# Patient Record
Sex: FEMALE | Race: WHITE
[De-identification: names, ages, dates, MRNs, and addresses within clinical notes are randomized per-mention and may not be internally consistent; named-entity substitution may affect disease eponyms.]

---

## 2021-09-29 ENCOUNTER — HOSPITAL ENCOUNTER (OUTPATIENT)
Dept: HOSPITAL 61 - LAB | Age: 76
End: 2021-09-29
Attending: OPHTHALMOLOGY
Payer: MEDICARE

## 2021-09-29 DIAGNOSIS — Z20.822: ICD-10-CM

## 2021-09-29 DIAGNOSIS — Z01.812: Primary | ICD-10-CM

## 2021-09-29 PROCEDURE — U0003 INFECTIOUS AGENT DETECTION BY NUCLEIC ACID (DNA OR RNA); SEVERE ACUTE RESPIRATORY SYNDROME CORONAVIRUS 2 (SARS-COV-2) (CORONAVIRUS DISEASE [COVID-19]), AMPLIFIED PROBE TECHNIQUE, MAKING USE OF HIGH THROUGHPUT TECHNOLOGIES AS DESCRIBED BY CMS-2020-01-R: HCPCS

## 2021-10-01 ENCOUNTER — HOSPITAL ENCOUNTER (OUTPATIENT)
Dept: HOSPITAL 61 - SURG | Age: 76
Discharge: HOME | End: 2021-10-01
Attending: OPHTHALMOLOGY
Payer: MEDICARE

## 2021-10-01 VITALS — WEIGHT: 198.42 LBS | BODY MASS INDEX: 35.16 KG/M2 | HEIGHT: 63 IN

## 2021-10-01 VITALS — DIASTOLIC BLOOD PRESSURE: 78 MMHG | SYSTOLIC BLOOD PRESSURE: 178 MMHG

## 2021-10-01 VITALS — DIASTOLIC BLOOD PRESSURE: 76 MMHG | SYSTOLIC BLOOD PRESSURE: 174 MMHG

## 2021-10-01 DIAGNOSIS — Z98.42: ICD-10-CM

## 2021-10-01 DIAGNOSIS — H25.89: Primary | ICD-10-CM

## 2021-10-01 DIAGNOSIS — Z90.49: ICD-10-CM

## 2021-10-01 DIAGNOSIS — Z79.899: ICD-10-CM

## 2021-10-01 DIAGNOSIS — Z98.890: ICD-10-CM

## 2021-10-01 DIAGNOSIS — I10: ICD-10-CM

## 2021-10-01 PROCEDURE — V2632 POST CHMBR INTRAOCULAR LENS: HCPCS

## 2021-10-01 PROCEDURE — 66984 XCAPSL CTRC RMVL W/O ECP: CPT

## 2021-10-01 RX ADMIN — CYCLOPENTOLATE HYDROCHLORIDE SCH DROP: 20 SOLUTION/ DROPS OPHTHALMIC at 11:30

## 2021-10-01 RX ADMIN — CYCLOPENTOLATE HYDROCHLORIDE SCH DROP: 20 SOLUTION/ DROPS OPHTHALMIC at 11:35

## 2021-10-01 RX ADMIN — CYCLOPENTOLATE HYDROCHLORIDE SCH DROP: 20 SOLUTION/ DROPS OPHTHALMIC at 11:40

## 2021-10-01 RX ADMIN — PHENYLEPHRINE HYDROCHLORIDE SCH DROP: 100 SOLUTION/ DROPS OPHTHALMIC at 11:40

## 2021-10-01 RX ADMIN — PHENYLEPHRINE HYDROCHLORIDE SCH DROP: 100 SOLUTION/ DROPS OPHTHALMIC at 11:30

## 2021-10-01 RX ADMIN — PHENYLEPHRINE HYDROCHLORIDE SCH DROP: 100 SOLUTION/ DROPS OPHTHALMIC at 11:35

## 2021-10-01 NOTE — OP
DATE OF SURGERY: 10/01/2021

PREOPERATIVE DIAGNOSIS:   Cataract of the left eye.



PROCEDURE:  Phacoemulsification with posterior chamber intraocular lens 

implantation of the left eye.



INDICATION:  Painless progressive visual loss and visually significant cataract 

and difficulty reading and driving.



SURGEON:  Candis Araya MD.



ANESTHESIA:  Topical with monitored anesthesia care.



DESCRIPTION OF PROCEDURE:  The left eye was prepped with Betadine in the usual 

sterile fashion and draped.  A paracentesis was performed followed by 

instillation of preservative-free phenylephrine admixed with lidocaine and 

balanced salt solution.  A temporal clear corneal incision was made, followed by

instillation of viscoelastic.  A capsulorrhexis was performed, followed by 

hydrodissection.  The phacoemulsification handpiece was used to remove the 

nucleus in a modified stop and chop fashion.  The IA handpiece was used to 

remove the cortex.  Provisc was injected into the anterior chamber and an Jd 

model [id number] with a power of 21.5 diopters was placed into the capsular bag.  

Balanced salt solution was used to hydrate the corneal wounds and the 

viscoelastic evacuated with the IA handpiece.  Once no leak was noted, Maxitrol 

was placed on the eye and the eye shielded, and the patient was sent to the 

recovery room uneventfully.







AMADA

DR: Rosetta   DD: 10/01/2021 13:03

DT: 10/01/2021 13:41   TID: [id number]

## 2021-10-08 ENCOUNTER — HOSPITAL ENCOUNTER (OUTPATIENT)
Dept: HOSPITAL 61 - SURG | Age: 76
Discharge: HOME | End: 2021-10-08
Attending: OPHTHALMOLOGY
Payer: MEDICARE

## 2021-10-08 VITALS — DIASTOLIC BLOOD PRESSURE: 79 MMHG | SYSTOLIC BLOOD PRESSURE: 169 MMHG

## 2021-10-08 VITALS — HEIGHT: 63 IN | BODY MASS INDEX: 35.16 KG/M2 | WEIGHT: 198.42 LBS

## 2021-10-08 VITALS — DIASTOLIC BLOOD PRESSURE: 62 MMHG | SYSTOLIC BLOOD PRESSURE: 138 MMHG

## 2021-10-08 DIAGNOSIS — Z98.890: ICD-10-CM

## 2021-10-08 DIAGNOSIS — I10: ICD-10-CM

## 2021-10-08 DIAGNOSIS — H25.89: Primary | ICD-10-CM

## 2021-10-08 DIAGNOSIS — Z90.49: ICD-10-CM

## 2021-10-08 DIAGNOSIS — Z79.899: ICD-10-CM

## 2021-10-08 PROCEDURE — A4930 STERILE, GLOVES PER PAIR: HCPCS

## 2021-10-08 PROCEDURE — V2632 POST CHMBR INTRAOCULAR LENS: HCPCS

## 2021-10-08 PROCEDURE — 66984 XCAPSL CTRC RMVL W/O ECP: CPT

## 2021-10-08 RX ADMIN — CYCLOPENTOLATE HYDROCHLORIDE SCH DROP: 20 SOLUTION/ DROPS OPHTHALMIC at 11:54

## 2021-10-08 RX ADMIN — PHENYLEPHRINE HYDROCHLORIDE SCH DROP: 100 SOLUTION/ DROPS OPHTHALMIC at 11:54

## 2021-10-08 RX ADMIN — CYCLOPENTOLATE HYDROCHLORIDE SCH DROP: 20 SOLUTION/ DROPS OPHTHALMIC at 12:01

## 2021-10-08 RX ADMIN — CYCLOPENTOLATE HYDROCHLORIDE SCH DROP: 20 SOLUTION/ DROPS OPHTHALMIC at 11:48

## 2021-10-08 RX ADMIN — PHENYLEPHRINE HYDROCHLORIDE SCH DROP: 100 SOLUTION/ DROPS OPHTHALMIC at 12:01

## 2021-10-08 RX ADMIN — PHENYLEPHRINE HYDROCHLORIDE SCH DROP: 100 SOLUTION/ DROPS OPHTHALMIC at 11:48

## 2021-10-08 NOTE — OP
DATE OF SURGERY: 10/08/2021

PREOPERATIVE DIAGNOSIS:  Cataract in the right eye.



PROCEDURE:  Phacoemulsification with posterior chamber intraocular lens 

implantation of the right eye.



INDICATIONS: Painless progressive visual loss and visually significant cataract 

and difficulty reading and driving.



SURGEON:  Candis Araya MD



ANESTHESIA:  Topical with monitored anesthesia care.



DESCRIPTION OF PROCEDURE:  The right eye was prepped with Betadine in the usual 

sterile fashion and draped.  A paracentesis was performed followed by 

instillation of preservative-free phenylephrine admixed with lidocaine and 

balanced salt solution.  A temporal clear corneal incision was made followed by 

instillation of Viscoat.  A capsulorrhexis was performed, followed by 

hydrodissection.  The phacoemulsification handpiece was used to remove the 

nucleus in a modified stop and chop fashion.  The I/A handpiece was used to 

remove the cortex.  Provisc was injected in the anterior chamber.  An Jd 

model [id number] with a power of 20.5 diopters was placed into the capsular bag.  

Balanced salt solution was used to hydrate the corneal wounds and the 

viscoelastic evacuated with the I/A handpiece.  Once no leak was noted, Maxitrol

was placed on the eye and the eye shielded and the patient was sent to the 

recovery room uneventfully.







ANNI

DR: Rosetta   DD: 10/08/2021 13:00

DT: 10/08/2021 13:12   TID: [id number]

## 2021-11-12 ENCOUNTER — HOSPITAL ENCOUNTER (OUTPATIENT)
Dept: HOSPITAL 63 - MAMMO | Age: 76
End: 2021-11-12
Attending: FAMILY MEDICINE
Payer: MEDICARE

## 2021-11-12 DIAGNOSIS — Z12.31: Primary | ICD-10-CM

## 2021-11-12 PROCEDURE — 77067 SCR MAMMO BI INCL CAD: CPT

## 2021-11-12 PROCEDURE — 77063 BREAST TOMOSYNTHESIS BI: CPT

## 2021-11-17 NOTE — RAD
Digital Mammogram Bilateral



History:  Routine screening



Technique:   2-D digital CC and MLO views were obtained.   CAD - computer aided detection was utilize
d. 



Comparison:  Mammograms from 5/13/2019 and 5/25/2018.



Findings:

Breast Tissue Density B : There are scattered areas of fibroglandular density



There are no suspicious masses, malignant appearing calcifications, or areas of architectural distort
ion. There are small areas of nodularity in both breasts which have not changed.



Impression:

No evidence of malignancy.



Assessment: BI-RADS Category 1:  Negative.



Recommendation: Routine screening mammograms.



The patient will receive a letter with the results in the mail. Patient information will be entered i
nto the mammography reminder system with a target recall date for the next mammogram. A reminder ruby
er will be generated.



Electronically signed by: Miranda Owens MD (11/17/2021 5:05 PM) [id number]

## 2021-12-21 ENCOUNTER — HOSPITAL ENCOUNTER (INPATIENT)
Dept: HOSPITAL 63 - ER | Age: 76
LOS: 17 days | Discharge: NURSING, REHAB OR LONG-TERM CARE | DRG: 177 | End: 2022-01-07
Attending: INTERNAL MEDICINE | Admitting: INTERNAL MEDICINE
Payer: MEDICARE

## 2021-12-21 VITALS — DIASTOLIC BLOOD PRESSURE: 74 MMHG | SYSTOLIC BLOOD PRESSURE: 173 MMHG

## 2021-12-21 VITALS — BODY MASS INDEX: 36.11 KG/M2 | HEIGHT: 62 IN | WEIGHT: 196.21 LBS

## 2021-12-21 VITALS — DIASTOLIC BLOOD PRESSURE: 82 MMHG | SYSTOLIC BLOOD PRESSURE: 178 MMHG

## 2021-12-21 DIAGNOSIS — E87.0: ICD-10-CM

## 2021-12-21 DIAGNOSIS — I10: ICD-10-CM

## 2021-12-21 DIAGNOSIS — N28.9: ICD-10-CM

## 2021-12-21 DIAGNOSIS — Z98.41: ICD-10-CM

## 2021-12-21 DIAGNOSIS — Z82.49: ICD-10-CM

## 2021-12-21 DIAGNOSIS — Z98.42: ICD-10-CM

## 2021-12-21 DIAGNOSIS — Z28.3: ICD-10-CM

## 2021-12-21 DIAGNOSIS — U07.1: Primary | ICD-10-CM

## 2021-12-21 DIAGNOSIS — J96.01: ICD-10-CM

## 2021-12-21 DIAGNOSIS — J84.10: ICD-10-CM

## 2021-12-21 DIAGNOSIS — J12.82: ICD-10-CM

## 2021-12-21 DIAGNOSIS — Z79.899: ICD-10-CM

## 2021-12-21 DIAGNOSIS — Z66: ICD-10-CM

## 2021-12-21 LAB
ALBUMIN SERPL-MCNC: 3.2 G/DL (ref 3.4–5)
ALBUMIN/GLOB SERPL: 0.7 {RATIO} (ref 1–1.7)
ALP SERPL-CCNC: 247 U/L (ref 46–116)
ALT SERPL-CCNC: 162 U/L (ref 14–59)
ANION GAP SERPL CALC-SCNC: 15 MMOL/L (ref 6–14)
APTT PPP: YELLOW S
AST SERPL-CCNC: 219 U/L (ref 15–37)
BACTERIA #/AREA URNS HPF: 0 /HPF
BASOPHILS # BLD AUTO: 0 X10^3/UL (ref 0–0.2)
BASOPHILS NFR BLD: 0 % (ref 0–3)
BILIRUB SERPL-MCNC: 1.3 MG/DL (ref 0.2–1)
BILIRUB UR QL STRIP: (no result)
BUN/CREAT SERPL: 30 (ref 6–20)
CA-I SERPL ISE-MCNC: 39 MG/DL (ref 7–20)
CALCIUM SERPL-MCNC: 8.8 MG/DL (ref 8.5–10.1)
CHLORIDE SERPL-SCNC: 106 MMOL/L (ref 98–107)
CO2 SERPL-SCNC: 26 MMOL/L (ref 21–32)
CREAT SERPL-MCNC: 1.3 MG/DL (ref 0.6–1)
EOSINOPHIL NFR BLD: 0 % (ref 0–3)
EOSINOPHIL NFR BLD: 0 X10^3/UL (ref 0–0.7)
ERYTHROCYTE [DISTWIDTH] IN BLOOD BY AUTOMATED COUNT: 14.4 % (ref 11.5–14.5)
FIBRINOGEN PPP-MCNC: CLEAR MG/DL
GFR SERPLBLD BASED ON 1.73 SQ M-ARVRAT: 40 ML/MIN
GLOBULIN SER-MCNC: 4.3 G/DL (ref 2.2–3.8)
GLUCOSE SERPL-MCNC: 164 MG/DL (ref 70–99)
GLUCOSE UR STRIP-MCNC: (no result) MG/DL
HCT VFR BLD CALC: 37 % (ref 36–47)
HGB BLD-MCNC: 12.5 G/DL (ref 12–15.5)
INFLUENZA A PATIENT: NEGATIVE
INFLUENZA B PATIENT: NEGATIVE
LYMPHOCYTES # BLD: 1.4 X10^3/UL (ref 1–4.8)
LYMPHOCYTES NFR BLD AUTO: 12 % (ref 24–48)
MCH RBC QN AUTO: 31 PG (ref 25–35)
MCHC RBC AUTO-ENTMCNC: 34 G/DL (ref 31–37)
MCV RBC AUTO: 91 FL (ref 79–100)
MONO #: 0.6 X10^3/UL (ref 0–1.1)
MONOCYTES NFR BLD: 5 % (ref 0–9)
NEUT #: 10.2 X10^3UL (ref 1.8–7.7)
NEUTROPHILS NFR BLD AUTO: 84 % (ref 31–73)
NITRITE UR QL STRIP: (no result)
PLATELET # BLD AUTO: 369 X10^3/UL (ref 140–400)
POTASSIUM SERPL-SCNC: 3.7 MMOL/L (ref 3.5–5.1)
PROT SERPL-MCNC: 7.5 G/DL (ref 6.4–8.2)
RBC # BLD AUTO: 4.07 X10^6/UL (ref 3.5–5.4)
RBC #/AREA URNS HPF: (no result) /HPF (ref 0–2)
SODIUM SERPL-SCNC: 147 MMOL/L (ref 136–145)
SP GR UR STRIP: <=1.005
SQUAMOUS #/AREA URNS LPF: (no result) /LPF
UROBILINOGEN UR-MCNC: 1 MG/DL
WBC # BLD AUTO: 12.1 X10^3/UL (ref 4–11)
WBC #/AREA URNS HPF: (no result) /HPF (ref 0–4)

## 2021-12-21 PROCEDURE — 82947 ASSAY GLUCOSE BLOOD QUANT: CPT

## 2021-12-21 PROCEDURE — 87426 SARSCOV CORONAVIRUS AG IA: CPT

## 2021-12-21 PROCEDURE — 96365 THER/PROPH/DIAG IV INF INIT: CPT

## 2021-12-21 PROCEDURE — U0003 INFECTIOUS AGENT DETECTION BY NUCLEIC ACID (DNA OR RNA); SEVERE ACUTE RESPIRATORY SYNDROME CORONAVIRUS 2 (SARS-COV-2) (CORONAVIRUS DISEASE [COVID-19]), AMPLIFIED PROBE TECHNIQUE, MAKING USE OF HIGH THROUGHPUT TECHNOLOGIES AS DESCRIBED BY CMS-2020-01-R: HCPCS

## 2021-12-21 PROCEDURE — 93005 ELECTROCARDIOGRAM TRACING: CPT

## 2021-12-21 PROCEDURE — 96375 TX/PRO/DX INJ NEW DRUG ADDON: CPT

## 2021-12-21 PROCEDURE — 96372 THER/PROPH/DIAG INJ SC/IM: CPT

## 2021-12-21 PROCEDURE — 85027 COMPLETE CBC AUTOMATED: CPT

## 2021-12-21 PROCEDURE — 5A0955A ASSISTANCE WITH RESPIRATORY VENTILATION, GREATER THAN 96 CONSECUTIVE HOURS, HIGH NASAL FLOW/VELOCITY: ICD-10-PCS | Performed by: INTERNAL MEDICINE

## 2021-12-21 PROCEDURE — 84484 ASSAY OF TROPONIN QUANT: CPT

## 2021-12-21 PROCEDURE — 87804 INFLUENZA ASSAY W/OPTIC: CPT

## 2021-12-21 PROCEDURE — 80048 BASIC METABOLIC PNL TOTAL CA: CPT

## 2021-12-21 PROCEDURE — 94640 AIRWAY INHALATION TREATMENT: CPT

## 2021-12-21 PROCEDURE — 85007 BL SMEAR W/DIFF WBC COUNT: CPT

## 2021-12-21 PROCEDURE — 80053 COMPREHEN METABOLIC PANEL: CPT

## 2021-12-21 PROCEDURE — 36415 COLL VENOUS BLD VENIPUNCTURE: CPT

## 2021-12-21 PROCEDURE — 81001 URINALYSIS AUTO W/SCOPE: CPT

## 2021-12-21 PROCEDURE — 71275 CT ANGIOGRAPHY CHEST: CPT

## 2021-12-21 PROCEDURE — 71045 X-RAY EXAM CHEST 1 VIEW: CPT

## 2021-12-21 PROCEDURE — 85025 COMPLETE CBC W/AUTO DIFF WBC: CPT

## 2021-12-21 NOTE — RAD
EXAM: CT angiography of the chest with intravenous contrast.



HISTORY: Shortness of breath.



TECHNIQUE: Computed tomographic images of the chest were obtained following the administration of int
ravenous contrast according to angiography protocol. Multiplanar reformatting was performed and three
 dimensional maximum intensity projection images were obtained.



*One or more of the following individualized dose reduction techniques were utilized for this examina
tion:  

1. Automated exposure control.  

2. Adjustment of the mA and/or kV according to patient size.  

3. Use of iterative reconstruction technique.



COMPARISON: None.



FINDINGS: There is no convincing pulmonary embolism. There is no evidence of right heart strain or pu
lmonary artery hypertension. The aorta is normal in caliber and there is a standard aortic arch branc
sahil pattern.



There is calcified atherosclerotic plaque involving the aorta, aortic arch great vessels and coronary
 arteries. There are enlarged mediastinal and right hilar lymph nodes. For reference purposes, there 
is a right hilar lymph node measuring 1.8 cm.



There is multifocal groundglass infiltrate. There is superimposed anterior medial right upper lobe pa
rtial consolidation. There is no pneumothorax. There is no pleural effusion.



There is no acute finding involving the upper abdomen. There is no acute or suspicious osseous findin
g.



IMPRESSION: 

1. No evidence of pulmonary embolism.

2. Bilateral multifocal groundglass infiltrate and partial anterior medial right upper lobe consolida
tion. Correlate for atypical pneumonia. Follow-up to confirm resolution.

3. Mediastinal and right hilar lymphadenopathy, likely reactive in etiology. 



Electronically signed by: Gisela Rouse MD (12/21/2021 10:32 AM) [id number]

## 2021-12-21 NOTE — HP
DATE OF SERVICE: 2021

ADMIT DATE: 2021

HISTORY OF PRESENT ILLNESS:  The patient is a 76-year-old  female 

patient who presented to the Emergency Room with a complaint of shortness of 

breath and generally feeling ill.  She also had dry cough.  She stated that she 

has a general feeling of not being well for almost a week.  She does not 

describe any specific symptoms other than the dry cough.  She is feeling more 

short of breath today, this morning.  When EMS arrived, they determined that she

needed supplemental oxygen, the patient is not usually on oxygen.  She denies 

any history of heart or lung problem.  She did have some diarrhea.  Denied any 

nausea or vomiting.  The patient was not vaccinated against COVID-19.  She was 

extensively investigated and has had lab work including a CBC with a white cell 

count that was slightly high at 12.1.  Her chemistry showed she has 

hypernatremia and impaired kidney function and also transaminitis.  Her 

urinalysis was essentially unremarkable and her coronavirus rapid testing was 

negative; however, she tested positive at the urgent care.  Her influenza A and 

B were negative.  Has had a chest x-ray, which showed multifocal ill-defined 

opacities bilaterally concerning for pneumonia including viral pneumonia.  Her 

CT angio of the chest showed no evidence of pulmonary embolism, bilateral 

multifocal ground glass infiltrates and partial anteromedial right upper lobe 

consolidation, correlate for atypical pneumonia.  She does have mediastinal and 

right hilar lymphadenopathy, likely reactive in etiology.  She was admitted with

diagnosis of COVID-19 pneumonia.  She probably has some underlying pulmonary 

fibrosis.  She has been exposed to pigeons.  She has an  grey parrot and 

she also keeps finches and canaries at home.



PAST MEDICAL HISTORY:  Significant for hypertension.



PAST SURGICAL HISTORY:  Significant for cholecystectomy, tonsillectomy, 

bilateral cataract extraction and has had a colonoscopy.



ALLERGIES:  She has no known drug allergies.



MEDICATIONS:  She is currently on the following medications:  She said she is on

a blood pressure medication, the name of which is not known to her.



FAMILY HISTORY:  She has one sister, younger, and has hypertension.  Her father 

 at the age of 62 because of myocardial infarction.  Her mother  at the 

age of 100.



SOCIAL HISTORY:  She is . She has 2 sons.  She has never smoked, does 

not drink alcohol or use recreational drugs.  She is retired from [employer] where 

she worked for about more than 20 years.  She also worked in preventive medicine

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



REVIEW OF SYSTEMS:  As per history of present illness.



PHYSICAL EXAMINATION:

GENERAL:  On arrival to the Emergency Room, the patient was slightly tachypneic,

but there was no pallor, jaundice, cyanosis. No lymphadenopathy, no thyromegaly,

no jugular venous distention.  No lower limb edema.

VITAL SIGNS:  Her heart rate was 92, blood pressure was 135/79, temperature was 

99.6, respiratory rate 20, and oxygen saturation was 95% on 6 liters of oxygen.

HEAD, EYES, EARS, NOSE, AND THROAT:  Normocephalic, atraumatic.

NECK:  Supple.

HEART:  Showed normal first and second heart sounds.  No gallop, rub, or murmur.

CHEST:  Showed central trachea, equal bilateral chest expansion, air entry, 

vesicular breath sounds with bilateral basal crepitation diffusely.  I could not

appreciate any rhonchi or wheezing.

ABDOMEN:  Slightly distended, soft, nontender.

NEUROLOGIC:  She was grossly intact.



LABORATORY DATA:  Her lab work showed a white cell count of 12,000, hemoglobin 

12.5, hematocrit 37, MCV 91, and platelet count 369,000 with an automated 

differential showed 84% polymorphs, 12% lymphocytes and 5% monocytes.  Her 

chemistry showed a serum sodium 147, potassium 3.7, chloride 106, bicarbonate 

26, anion gap of 15, BUN 39, creatinine 1.3.  Estimated GFR was 40 mL per 

minute.  Her glucose was 64, calcium was 8.8.  Total bilirubin, AST, ALT, 

alkaline phosphatase are all elevated.  Her troponin I high sensitivity was 29 

and her total protein 7.5, albumin was 3.2.  Her chest x-ray showed multifocal 

ill-defined opacities bilaterally concerning for pneumonia including viral 

pneumonia.  Her CT angio of the chest showed no evidence of pulmonary emboli.  

She does have bilateral multifocal ground glass infiltrates and partial 

anteromedial right upper lobe consolidation, and mediastinal and right hilar 

lymphadenopathy, likely reactive in etiology.



ASSESSMENT AND PLAN:  The patient was admitted with COVID-19 pneumonia, acute 

hypoxic respiratory failure, possible superimposed community-acquired pneumonia 

and underlying pulmonary fibrosis given her exposure to pigeons.  She also has 

an  grey parrot, finches and canaries.  Her lungs have coarse early 

inspiratory crackles.  The patient was started on IV ceftriaxone and Zithromax. 

We will continue with dexamethasone as well as Lovenox.  Continue with oxygen 

supplementation.  We will discuss with the pharmacy whether she is a candidate 

for remdesivir and monitor her response.  Her blood sugar was slightly high, so 

we will probably need to check her blood sugar.  She might require insulin 

sliding scale, especially as she is on dexamethasone.







AMM/VIS/CARLY

DR: AMM/nts   DD: 2021 17:48

DT: 2021 18:20   TID: [id number]

## 2021-12-21 NOTE — EKG
[facility] [address], [place] [address]

Test Date:    2021               Test Time:    09:09:15

Pat Name:     PRINCE JONES           Department:   

Patient ID:   [id number]           Room:          

Gender:       F                        Technician:   COOPER

:          1945               Requested By: JONNY GARCIA

Order Number: [id number]            Reading MD:   Filemon Art MD

                                 Measurements

Intervals                              Axis          

Rate:         84                       P:            51

AL:           160                      QRS:          -8

QRSD:         80                       T:            -5

QT:           390                                    

QTc:          464                                    

                           Interpretive Statements

SINUS RHYTHM

NON-SPECIFIC ST/T CHANGES

Electronically Signed On 2021 15:19:50 CST by Filemon Art MD

## 2021-12-21 NOTE — RAD
XR CHEST 1V



History: Reason: SOB / Spl. Instructions:  / History: 



Comparison: None.



Findings:

Multifocal opacities bilaterally most prominent within the mid to lower lungs. No pleural effusion. N
o pneumothorax. Normal heart size.



Impression: 

1.  Multifocal ill-defined opacities bilaterally, concerning for pneumonia including viral pneumonia.




Electronically signed by: Juan Carlos Crawley DO (12/21/2021 9:38 AM) [id number]

## 2021-12-21 NOTE — PHYS DOC
General Adult


EDM:


Chief Complaint:  SHORTNESS OF BREATH





HPI:


HPI:


76-year-old female presents via EMS with shortness of breath and generally 

feeling ill.  Patient has had a general feeling of not being well for almost a 

week.  She does not describe any specific symptoms.  She was feeling more short 

of breath today.  When EMS arrived they determined she needed supplemental 

oxygen.  Patient is not usually on oxygen.  She denies any history of heart or 

lung problems.  She has had some diarrhea.  She denies nausea or vomiting.  The 

patient is not vaccinated against COVID-19.





Review of Systems:


Review of Systems:


Constitutional:  Denies fever or chills 


Eyes:  Denies change in visual acuity 


HENT:  Denies nasal congestion or sore throat 


Respiratory:  shortness of breath 


Cardiovascular:  Denies chest pain or edema 


GI:  Diarrhea. Denies abdominal pain, nausea, vomiting, bloody stools.


: Denies dysuria 


Musculoskeletal:  Denies back pain or joint pain 


Integument:  Denies rash 


Neurologic:  Denies headache, focal weakness or sensory changes 


Endocrine:  Denies polyuria or polydipsia 


Lymphatic:  Denies swollen glands 


Psychiatric:  Denies depression or anxiety





Physical Exam:


PE:





Constitutional: Well developed, well nourished, no acute distress, non-toxic 

appearance. []


HENT: Normocephalic, atraumatic, bilateral external ears normal, oropharynx 

moist, no oral exudates, nose normal. []


Eyes: PERRLA, EOMI, conjunctiva normal, no discharge. [] 


Neck: Normal range of motion, no tenderness, supple, no stridor. [] 


Cardiovascular: Heart rate 88, regular rhythm, no murmur []


Lungs & Thorax:  Bilateral breath sounds clear to auscultation.  On a 

nonrebreather []


Abdomen: Bowel sounds normal, soft, no tenderness, no masses, no pulsatile 

masses. [] 


Skin: Warm, dry, no erythema, no rash. [] 


Back: No tenderness, no CVA tenderness. [] 


Extremities: No tenderness, no cyanosis, no clubbing, ROM intact, no edema. [] 


Neurologic: Alert and oriented X 3, normal motor function, normal sensory 

function, no focal deficits noted. []


Psychologic: Affect normal, judgement normal, mood normal. []





EKG:


EKG:


[]





Radiology/Procedures:


Radiology/Procedures:


[]


Impressions:


XR CHEST 1V





History: Reason: SOB / Spl. Instructions:  / History: 





Comparison: None.





Findings:


Multifocal opacities bilaterally most prominent within the mid to lower lungs. 

No pleural effusion. No pneumothorax. Normal heart size.





Impression: 


1.  Multifocal ill-defined opacities bilaterally, concerning for pneumonia 

including viral pneumonia.





Electronically signed by: Juan Carlos Crawley DO (12/21/2021 9:38 AM) [id number]





DICTATED AND SIGNED BY:     JUAN CARLOS CRAWLEY DO


DATE:     12/21/21 0937





CC: JONNY GARCIA DO; KEILA PABLO ~[facility]0 0














EXAM: CT angiography of the chest with intravenous contrast.





HISTORY: Shortness of breath.





TECHNIQUE: Computed tomographic images of the chest were obtained following the 

administration of intravenous contrast according to angiography protocol. 

Multiplanar reformatting was performed and three dimensional maximum intensity 

projection images were obtained.





*One or more of the following individualized dose reduction techniques were 

utilized for this examination:  


1. Automated exposure control.  


2. Adjustment of the mA and/or kV according to patient size.  


3. Use of iterative reconstruction technique.





COMPARISON: None.





FINDINGS: There is no convincing pulmonary embolism. There is no evidence of 

right heart strain or pulmonary artery hypertension. The aorta is normal in 

caliber and there is a standard aortic arch branching pattern.





There is calcified atherosclerotic plaque involving the aorta, aortic arch great

 vessels and coronary arteries. There are enlarged mediastinal and right hilar 

lymph nodes. For reference purposes, there is a right hilar lymph node measuring

 1.8 cm.





There is multifocal groundglass infiltrate. There is superimposed anterior 

medial right upper lobe partial consolidation. There is no pneumothorax. There 

is no pleural effusion.





There is no acute finding involving the upper abdomen. There is no acute or s

uspicious osseous finding.





IMPRESSION: 


1. No evidence of pulmonary embolism.


2. Bilateral multifocal groundglass infiltrate and partial anterior medial right

 upper lobe consolidation. Correlate for atypical pneumonia. Follow-up to 

confirm resolution.


3. Mediastinal and right hilar lymphadenopathy, likely reactive in etiology. 





Electronically signed by: Gisela Nicholson MD (12/21/2021 10:32 AM) [id number]





DICTATED AND SIGNED BY:     GISELA NICHOLSON MD


DATE:     12/21/21 1029





CC: JONNY GARCIA DO; KEILA PABLO ~[facility]0 0





Heart Score:


C/O Chest Pain:  No


Risk Factors:


Risk Factors:  DM, Current or recent (<one month) smoker, HTN, HLP, family 

history of CAD, obesity.


Risk Scores:


Score 0 - 3:  2.5% MACE over next 6 weeks - Discharge Home


Score 4 - 6:  20.3% MACE over next 6 weeks - Admit for Clinical Observation


Score 7 - 10:  72.7% MACE over next 6 weeks - Early Invasive Strategies





Course & Med Decision Making:


Course & Med Decision Making


Pertinent Labs and Imaging studies reviewed. (See chart for details)


The patient's chest x-ray shows bilateral pneumonia.  CT angiogram of the chest 

shows no pulmonary embolus but groundglass opacities bilaterally.  I will treat 

her with Rocephin and azithromycin.  This is likely Covid pneumonia.  We have 

decreased the patient to 6 L high flow nasal cannula.  I will treat the patient 

with Decadron and Lovenox.  We will admit her to the hospital.  I spoke to the 

hospitalist, Dr. Aggarwal and he has accepted patient for admission.


[]





Dragon Disclaimer:


Dragon Disclaimer:


This electronic medical record was generated, in whole or in part, using a voice

 recognition dictation system.





Departure


Departure:


Impression:  


   Primary Impression:  


   Pneumonia due to COVID-19 virus


Disposition:  09 ADMITTED AS INPATIENT


Admitting Physician:  Renetta Aggarwal


Condition:  GUARDED











GARCIA,JONNY DAO                 Dec 21, 2021 09:03

## 2021-12-22 VITALS — DIASTOLIC BLOOD PRESSURE: 75 MMHG | SYSTOLIC BLOOD PRESSURE: 175 MMHG

## 2021-12-22 VITALS — SYSTOLIC BLOOD PRESSURE: 179 MMHG | DIASTOLIC BLOOD PRESSURE: 69 MMHG

## 2021-12-22 VITALS — DIASTOLIC BLOOD PRESSURE: 88 MMHG | SYSTOLIC BLOOD PRESSURE: 172 MMHG

## 2021-12-22 VITALS — SYSTOLIC BLOOD PRESSURE: 176 MMHG | DIASTOLIC BLOOD PRESSURE: 89 MMHG

## 2021-12-22 VITALS — DIASTOLIC BLOOD PRESSURE: 80 MMHG | SYSTOLIC BLOOD PRESSURE: 171 MMHG

## 2021-12-22 LAB
ALBUMIN SERPL-MCNC: 2.8 G/DL (ref 3.4–5)
ALBUMIN/GLOB SERPL: 0.6 {RATIO} (ref 1–1.7)
ALP SERPL-CCNC: 203 U/L (ref 46–116)
ALT SERPL-CCNC: 152 U/L (ref 14–59)
ANION GAP SERPL CALC-SCNC: 12 MMOL/L (ref 6–14)
AST SERPL-CCNC: 143 U/L (ref 15–37)
BILIRUB SERPL-MCNC: 0.7 MG/DL (ref 0.2–1)
BUN/CREAT SERPL: 26 (ref 6–20)
CA-I SERPL ISE-MCNC: 29 MG/DL (ref 7–20)
CALCIUM SERPL-MCNC: 8.9 MG/DL (ref 8.5–10.1)
CHLORIDE SERPL-SCNC: 111 MMOL/L (ref 98–107)
CO2 SERPL-SCNC: 26 MMOL/L (ref 21–32)
CREAT SERPL-MCNC: 1.1 MG/DL (ref 0.6–1)
ERYTHROCYTE [DISTWIDTH] IN BLOOD BY AUTOMATED COUNT: 14.5 % (ref 11.5–14.5)
GFR SERPLBLD BASED ON 1.73 SQ M-ARVRAT: 48.6 ML/MIN
GLOBULIN SER-MCNC: 4.6 G/DL (ref 2.2–3.8)
GLUCOSE SERPL-MCNC: 145 MG/DL (ref 70–99)
HCT VFR BLD CALC: 35.6 % (ref 36–47)
HGB BLD-MCNC: 11.8 G/DL (ref 12–15.5)
MCH RBC QN AUTO: 30 PG (ref 25–35)
MCHC RBC AUTO-ENTMCNC: 33 G/DL (ref 31–37)
MCV RBC AUTO: 92 FL (ref 79–100)
PLATELET # BLD AUTO: 377 X10^3/UL (ref 140–400)
POTASSIUM SERPL-SCNC: 3.8 MMOL/L (ref 3.5–5.1)
PROT SERPL-MCNC: 7.4 G/DL (ref 6.4–8.2)
RBC # BLD AUTO: 3.89 X10^6/UL (ref 3.5–5.4)
SODIUM SERPL-SCNC: 149 MMOL/L (ref 136–145)
WBC # BLD AUTO: 11.2 X10^3/UL (ref 4–11)

## 2021-12-22 RX ADMIN — INSULIN LISPRO SCH UNITS: 100 INJECTION, SOLUTION INTRAVENOUS; SUBCUTANEOUS at 17:46

## 2021-12-22 RX ADMIN — ENOXAPARIN SODIUM SCH MG: 100 INJECTION SUBCUTANEOUS at 08:12

## 2021-12-22 RX ADMIN — INSULIN LISPRO SCH UNITS: 100 INJECTION, SOLUTION INTRAVENOUS; SUBCUTANEOUS at 14:29

## 2021-12-22 RX ADMIN — AZITHROMYCIN SCH MG: 250 TABLET, FILM COATED ORAL at 08:12

## 2021-12-22 RX ADMIN — Medication SCH CAP: at 21:34

## 2021-12-22 RX ADMIN — Medication SCH CAP: at 08:12

## 2021-12-22 RX ADMIN — DEXAMETHASONE SODIUM PHOSPHATE SCH MG: 10 INJECTION INTRAMUSCULAR; INTRAVENOUS at 08:12

## 2021-12-22 RX ADMIN — INSULIN LISPRO SCH UNITS: 100 INJECTION, SOLUTION INTRAVENOUS; SUBCUTANEOUS at 11:02

## 2021-12-22 NOTE — PN
DATE: 12/22/2021

ATTENDING PHYSICIANS:  Dr. Aggarwal and Dr. Rizo



SUBJECTIVE:  Less dyspneic with exertion.  She is comfortable at rest.  No new 

complaints.



OBJECTIVE FINDINGS:

VITAL SIGNS:  Blood pressure this morning is 170/90, pulse is 57 and regular.  

She is afebrile.  Oxygen saturation 93% on 6 liters by nasal cannula.

HEENT:  Head is without trauma.  Pupils are reactive.  Sclerae nonicteric.  The 

oropharynx is clear.

NECK:  Supple, no bruits identified.

LUNGS:  Show bilateral rhonchi.  Fairly good air movement.

CARDIOVASCULAR:  Regular heart tones.

ABDOMEN:  Soft.

EXTREMITIES:  Without edema.  A CT chest as noted.



ASSESSMENT:

1.  A 76-year-old female with bilateral COVID pneumonia.

2.  Acute hypoxemic respiratory failure requiring supplemental oxygen.

3.  Essential hypertension.



PLAN:

1.  Continue current regimen.

2.  We are weaning down supplemental oxygen.  She is down from 8 liters to 6.

3.  Diet as tolerated.

4.  Continue empiric IV antibiotics to prevent secondary infection.







NAYELI/ZEV/VIS

DR: NAYELI/gilmer   DD: 12/22/2021 10:33

DT: 12/22/2021 13:27   TID: [id number]

CC:     Destiny Gray MD

## 2021-12-23 VITALS — DIASTOLIC BLOOD PRESSURE: 86 MMHG | SYSTOLIC BLOOD PRESSURE: 184 MMHG

## 2021-12-23 VITALS — SYSTOLIC BLOOD PRESSURE: 180 MMHG | DIASTOLIC BLOOD PRESSURE: 94 MMHG

## 2021-12-23 VITALS — SYSTOLIC BLOOD PRESSURE: 186 MMHG | DIASTOLIC BLOOD PRESSURE: 91 MMHG

## 2021-12-23 VITALS — DIASTOLIC BLOOD PRESSURE: 74 MMHG | SYSTOLIC BLOOD PRESSURE: 162 MMHG

## 2021-12-23 VITALS — DIASTOLIC BLOOD PRESSURE: 67 MMHG | SYSTOLIC BLOOD PRESSURE: 164 MMHG

## 2021-12-23 VITALS — DIASTOLIC BLOOD PRESSURE: 71 MMHG | SYSTOLIC BLOOD PRESSURE: 161 MMHG

## 2021-12-23 VITALS — SYSTOLIC BLOOD PRESSURE: 175 MMHG | DIASTOLIC BLOOD PRESSURE: 68 MMHG

## 2021-12-23 VITALS — DIASTOLIC BLOOD PRESSURE: 84 MMHG | SYSTOLIC BLOOD PRESSURE: 185 MMHG

## 2021-12-23 RX ADMIN — BUDESONIDE PRN MG: 0.5 SUSPENSION RESPIRATORY (INHALATION) at 11:23

## 2021-12-23 RX ADMIN — DEXAMETHASONE SODIUM PHOSPHATE SCH MG: 10 INJECTION INTRAMUSCULAR; INTRAVENOUS at 08:15

## 2021-12-23 RX ADMIN — AZITHROMYCIN SCH MG: 250 TABLET, FILM COATED ORAL at 08:14

## 2021-12-23 RX ADMIN — Medication SCH CAP: at 08:14

## 2021-12-23 RX ADMIN — Medication SCH CAP: at 20:30

## 2021-12-23 RX ADMIN — BUDESONIDE PRN MG: 0.5 SUSPENSION RESPIRATORY (INHALATION) at 20:30

## 2021-12-23 RX ADMIN — INSULIN LISPRO SCH UNITS: 100 INJECTION, SOLUTION INTRAVENOUS; SUBCUTANEOUS at 17:12

## 2021-12-23 RX ADMIN — INSULIN LISPRO SCH UNITS: 100 INJECTION, SOLUTION INTRAVENOUS; SUBCUTANEOUS at 11:47

## 2021-12-23 RX ADMIN — LOSARTAN POTASSIUM SCH MG: 50 TABLET, FILM COATED ORAL at 09:07

## 2021-12-23 RX ADMIN — ENOXAPARIN SODIUM SCH MG: 100 INJECTION SUBCUTANEOUS at 08:16

## 2021-12-23 RX ADMIN — INSULIN LISPRO SCH UNITS: 100 INJECTION, SOLUTION INTRAVENOUS; SUBCUTANEOUS at 08:00

## 2021-12-23 NOTE — PN
DATE: 12/23/2021

ATTENDING PHYSICIAN:  Dr. Aggarwal.



SUBJECTIVE:  The patient is comfortable.  She is clinically not any different 

when I saw her yesterday.  Last night, she got up to go to the bathroom, she 

became dyspneic, desaturated, oxygen requirements had to be increased to 15 

liters to maintain saturations.  Because of the nursing issues and staffing, she

was then moved to the intensive care for more one-on-one care.



OBJECTIVE FINDINGS:

VITAL SIGNS:  Blood pressure this morning is 180/84, pulse is 78 and regular.  

She is afebrile.  Oxygen saturation 90% on 15 liters nonrebreather mask.

HEENT:  Head is without trauma.  Pupils are reactive.  Sclerae nonicteric.  

Oropharynx clear.

NECK:  Supple, no bruits.

LUNGS:  Bibasilar rales.

CARDIOVASCULAR:  Showed regular heart tones.  No gallops.  Hyperdynamic 

precordium.

ABDOMEN:  Soft.

EXTREMITIES:  Without edema.

NEUROLOGIC:  Focally intact.



DIAGNOSTIC DATA:  Followup chest x-ray is basically unchanged.  There are 

bilateral interstitial infiltrates, nodular appearance consistent with atypical 

pneumonia.



ASSESSMENT:

1.  A 76-year-old female with bilateral COVID pneumonia.  She has not been 

vaccinated.

2.  Acute hypoxemic respiratory failure requiring higher doses of supplemental 

oxygen.

3.  Essential hypertension.



PLAN:

1.  Continue current regimen.  Her valsartan has been restarted.

2.  We are trying to wean down her oxygen demands.

3.  Diet as tolerated.

4.  Continue IV empiric antibiotics.

5.  Continue empiric Lovenox.



Please note that she is now a DNR per advanced directives.







ELI

DR: Zeke   DD: 12/23/2021 09:20

DT: 12/23/2021 21:36   TID: [id number]

CC:     Destiny Gray MD

## 2021-12-23 NOTE — RAD
EXAM: Chest, single view.



HISTORY: Covid 19.



COMPARISON: 12/21/2021



FINDINGS: A frontal view of the chest is obtained. There is stable slight increased diffuse lower lob
e predominant interstitial and alveolar infiltrate. There is no pleural effusion or pneumothorax. The
re is a stable cardiac silhouette. There is stable right hilar prominence likely due to lymphadenopat
hy.



IMPRESSION: 

1. Stable slight increased multifocal lower lobe predominant interstitial and alveolar infiltrate.

2. Stable right hilar lymphadenopathy.



Electronically signed by: Gisela Rouse MD (12/23/2021 9:11 AM) [id number]

## 2021-12-24 VITALS — DIASTOLIC BLOOD PRESSURE: 73 MMHG | SYSTOLIC BLOOD PRESSURE: 157 MMHG

## 2021-12-24 VITALS — DIASTOLIC BLOOD PRESSURE: 53 MMHG | SYSTOLIC BLOOD PRESSURE: 137 MMHG

## 2021-12-24 VITALS — SYSTOLIC BLOOD PRESSURE: 157 MMHG | DIASTOLIC BLOOD PRESSURE: 70 MMHG

## 2021-12-24 VITALS — SYSTOLIC BLOOD PRESSURE: 164 MMHG | DIASTOLIC BLOOD PRESSURE: 80 MMHG

## 2021-12-24 VITALS — SYSTOLIC BLOOD PRESSURE: 172 MMHG | DIASTOLIC BLOOD PRESSURE: 70 MMHG

## 2021-12-24 VITALS — DIASTOLIC BLOOD PRESSURE: 85 MMHG | SYSTOLIC BLOOD PRESSURE: 181 MMHG

## 2021-12-24 VITALS — SYSTOLIC BLOOD PRESSURE: 181 MMHG | DIASTOLIC BLOOD PRESSURE: 90 MMHG

## 2021-12-24 VITALS — DIASTOLIC BLOOD PRESSURE: 75 MMHG | SYSTOLIC BLOOD PRESSURE: 171 MMHG

## 2021-12-24 VITALS — SYSTOLIC BLOOD PRESSURE: 182 MMHG | DIASTOLIC BLOOD PRESSURE: 90 MMHG

## 2021-12-24 VITALS — SYSTOLIC BLOOD PRESSURE: 172 MMHG | DIASTOLIC BLOOD PRESSURE: 74 MMHG

## 2021-12-24 VITALS — SYSTOLIC BLOOD PRESSURE: 135 MMHG | DIASTOLIC BLOOD PRESSURE: 71 MMHG

## 2021-12-24 VITALS — SYSTOLIC BLOOD PRESSURE: 138 MMHG | DIASTOLIC BLOOD PRESSURE: 61 MMHG

## 2021-12-24 VITALS — SYSTOLIC BLOOD PRESSURE: 146 MMHG | DIASTOLIC BLOOD PRESSURE: 67 MMHG

## 2021-12-24 RX ADMIN — BUDESONIDE PRN MG: 0.5 SUSPENSION RESPIRATORY (INHALATION) at 05:53

## 2021-12-24 RX ADMIN — Medication SCH CAP: at 20:34

## 2021-12-24 RX ADMIN — INSULIN LISPRO SCH UNITS: 100 INJECTION, SOLUTION INTRAVENOUS; SUBCUTANEOUS at 16:54

## 2021-12-24 RX ADMIN — ENOXAPARIN SODIUM SCH MG: 100 INJECTION SUBCUTANEOUS at 08:51

## 2021-12-24 RX ADMIN — INSULIN LISPRO SCH UNITS: 100 INJECTION, SOLUTION INTRAVENOUS; SUBCUTANEOUS at 12:13

## 2021-12-24 RX ADMIN — Medication SCH CAP: at 08:51

## 2021-12-24 RX ADMIN — INSULIN LISPRO SCH UNITS: 100 INJECTION, SOLUTION INTRAVENOUS; SUBCUTANEOUS at 08:00

## 2021-12-24 RX ADMIN — AZITHROMYCIN SCH MG: 250 TABLET, FILM COATED ORAL at 08:51

## 2021-12-24 RX ADMIN — LOSARTAN POTASSIUM SCH MG: 50 TABLET, FILM COATED ORAL at 08:52

## 2021-12-24 RX ADMIN — DEXAMETHASONE SODIUM PHOSPHATE SCH MG: 10 INJECTION INTRAMUSCULAR; INTRAVENOUS at 08:51

## 2021-12-24 RX ADMIN — BUDESONIDE PRN MG: 0.5 SUSPENSION RESPIRATORY (INHALATION) at 20:34

## 2021-12-24 NOTE — PN
DATE: 12/24/2021

ATTENDING PHYSICIANS:  Dr. Aggarwal/Dr. Rizo.



SUBJECTIVE:  The patient is comfortable.  She has no new complaints.  She is not

dyspneic at rest.



OBJECTIVE FINDINGS:

VITAL SIGNS:  Blood pressure this morning is 170/75.  She has not had her 

morning meds yet.  Pulse is 50 and regular.  She is afebrile.  Oxygen saturation

93% on 12 liters by nasal cannula.

HEENT:  Head is without trauma.  Pupils are reactive.  Sclerae nonicteric.  

Oropharynx clear.

NECK:  Supple, no bruits identified.

LUNGS:  Coarse rhonchi bilaterally.

CARDIOVASCULAR:  Regular heart tones.

ABDOMEN:  Soft.

EXTREMITIES:  Without edema.

NEUROLOGIC:  Focally intact.  Speech is fluent.

SKIN:  Warm and dry.



ASSESSMENT:

1.  A 76-year-old patient with bilateral COVID pneumonia.  She has not been 

vaccinated.

2.  Acute hypoxemic respiratory failure, requiring high doses of supplemental 

oxygen.

3.  Essential hypertension.



PLAN:

1.  Continue supplemental oxygen and supportive care.

2.  We are trying to wean down her oxygen demands to maintain adequate 

saturations.

3.  Diet as tolerated.

4.  Continue empiric antibiotics.

5.  Continue empiric Lovenox.







NAYELI/GITA

DR: Zeke   DD: 12/24/2021 09:33

DT: 12/24/2021 09:46   TID: [id number]

CC:     ADELFO PABLO MD

## 2021-12-25 VITALS — SYSTOLIC BLOOD PRESSURE: 157 MMHG | DIASTOLIC BLOOD PRESSURE: 70 MMHG

## 2021-12-25 VITALS — SYSTOLIC BLOOD PRESSURE: 178 MMHG | DIASTOLIC BLOOD PRESSURE: 78 MMHG

## 2021-12-25 VITALS — DIASTOLIC BLOOD PRESSURE: 85 MMHG | SYSTOLIC BLOOD PRESSURE: 187 MMHG

## 2021-12-25 VITALS — SYSTOLIC BLOOD PRESSURE: 173 MMHG | DIASTOLIC BLOOD PRESSURE: 72 MMHG

## 2021-12-25 VITALS — SYSTOLIC BLOOD PRESSURE: 165 MMHG | DIASTOLIC BLOOD PRESSURE: 75 MMHG

## 2021-12-25 VITALS — SYSTOLIC BLOOD PRESSURE: 175 MMHG | DIASTOLIC BLOOD PRESSURE: 84 MMHG

## 2021-12-25 VITALS — SYSTOLIC BLOOD PRESSURE: 188 MMHG | DIASTOLIC BLOOD PRESSURE: 83 MMHG

## 2021-12-25 VITALS — SYSTOLIC BLOOD PRESSURE: 148 MMHG | DIASTOLIC BLOOD PRESSURE: 62 MMHG

## 2021-12-25 VITALS — SYSTOLIC BLOOD PRESSURE: 123 MMHG | DIASTOLIC BLOOD PRESSURE: 97 MMHG

## 2021-12-25 VITALS — SYSTOLIC BLOOD PRESSURE: 167 MMHG | DIASTOLIC BLOOD PRESSURE: 72 MMHG

## 2021-12-25 VITALS — DIASTOLIC BLOOD PRESSURE: 80 MMHG | SYSTOLIC BLOOD PRESSURE: 175 MMHG

## 2021-12-25 VITALS — DIASTOLIC BLOOD PRESSURE: 72 MMHG | SYSTOLIC BLOOD PRESSURE: 175 MMHG

## 2021-12-25 RX ADMIN — DEXAMETHASONE SODIUM PHOSPHATE SCH MG: 10 INJECTION INTRAMUSCULAR; INTRAVENOUS at 07:53

## 2021-12-25 RX ADMIN — LOSARTAN POTASSIUM SCH MG: 50 TABLET, FILM COATED ORAL at 07:53

## 2021-12-25 RX ADMIN — INSULIN LISPRO SCH UNITS: 100 INJECTION, SOLUTION INTRAVENOUS; SUBCUTANEOUS at 17:00

## 2021-12-25 RX ADMIN — BUDESONIDE PRN MG: 0.5 SUSPENSION RESPIRATORY (INHALATION) at 05:23

## 2021-12-25 RX ADMIN — INSULIN LISPRO SCH UNITS: 100 INJECTION, SOLUTION INTRAVENOUS; SUBCUTANEOUS at 08:00

## 2021-12-25 RX ADMIN — INSULIN LISPRO SCH UNITS: 100 INJECTION, SOLUTION INTRAVENOUS; SUBCUTANEOUS at 12:00

## 2021-12-25 RX ADMIN — BUDESONIDE PRN MG: 0.5 SUSPENSION RESPIRATORY (INHALATION) at 19:41

## 2021-12-25 RX ADMIN — AZITHROMYCIN SCH MG: 250 TABLET, FILM COATED ORAL at 07:52

## 2021-12-25 RX ADMIN — ENOXAPARIN SODIUM SCH MG: 100 INJECTION SUBCUTANEOUS at 07:54

## 2021-12-25 RX ADMIN — Medication SCH CAP: at 19:41

## 2021-12-25 RX ADMIN — Medication SCH CAP: at 07:52

## 2021-12-25 RX ADMIN — INSULIN LISPRO SCH UNITS: 100 INJECTION, SOLUTION INTRAVENOUS; SUBCUTANEOUS at 16:59

## 2021-12-25 NOTE — PN
DATE: 12/25/2021

ATTENDING PHYSICIAN:  Dr. Rizo.



SUBJECTIVE:  Comfortable and no new complaints.  She has not had any further 

dyspnea.



OBJECTIVE FINDINGS:

VITAL SIGNS:  Blood pressure this morning is 187/77 mmHg, pulse 54 and regular. 

She is afebrile.  Oxygen saturation adequate on 8 liters.

HEENT:  Head is without trauma.  Pupils are reactive.  Sclerae nonicteric.  

Oropharynx is clear.

NECK:  Supple. No bruits identified.

LUNGS:  Coarse rhonchi bilaterally.

CARDIOVASCULAR SYSTEM:  Regular heart tones.

ABDOMEN:  Soft.

EXTREMITIES:  Without edema.

NEUROLOGIC:  Focally intact.



ASSESSMENT:

1.  A 76-year-old female with COVID-19 pneumonia bilaterally.

2.  Acute respiratory failure with hypoxemia, improving.

3.  Labile hypertension.



PLAN:

1.  Continue weaning down oxygen demands.

2.  Diet as tolerated.

3.  Continue current regimen.

4.  Discharge planning when her oxygen requirements are at a minimal.  She can 

go home on a small dose of supplemental oxygen.







NAYELI/VIS

DR: Zeke   DD: 12/25/2021 10:30

DT: 12/25/2021 10:38   TID: [id number]

## 2021-12-26 VITALS — SYSTOLIC BLOOD PRESSURE: 166 MMHG | DIASTOLIC BLOOD PRESSURE: 79 MMHG

## 2021-12-26 VITALS — SYSTOLIC BLOOD PRESSURE: 166 MMHG | DIASTOLIC BLOOD PRESSURE: 78 MMHG

## 2021-12-26 VITALS — DIASTOLIC BLOOD PRESSURE: 69 MMHG | SYSTOLIC BLOOD PRESSURE: 161 MMHG

## 2021-12-26 VITALS — DIASTOLIC BLOOD PRESSURE: 95 MMHG | SYSTOLIC BLOOD PRESSURE: 162 MMHG

## 2021-12-26 VITALS — DIASTOLIC BLOOD PRESSURE: 73 MMHG | SYSTOLIC BLOOD PRESSURE: 168 MMHG

## 2021-12-26 VITALS — SYSTOLIC BLOOD PRESSURE: 179 MMHG | DIASTOLIC BLOOD PRESSURE: 77 MMHG

## 2021-12-26 VITALS — SYSTOLIC BLOOD PRESSURE: 177 MMHG | DIASTOLIC BLOOD PRESSURE: 88 MMHG

## 2021-12-26 VITALS — SYSTOLIC BLOOD PRESSURE: 161 MMHG | DIASTOLIC BLOOD PRESSURE: 69 MMHG

## 2021-12-26 VITALS — SYSTOLIC BLOOD PRESSURE: 183 MMHG | DIASTOLIC BLOOD PRESSURE: 85 MMHG

## 2021-12-26 VITALS — SYSTOLIC BLOOD PRESSURE: 152 MMHG | DIASTOLIC BLOOD PRESSURE: 75 MMHG

## 2021-12-26 VITALS — SYSTOLIC BLOOD PRESSURE: 185 MMHG | DIASTOLIC BLOOD PRESSURE: 77 MMHG

## 2021-12-26 VITALS — DIASTOLIC BLOOD PRESSURE: 79 MMHG | SYSTOLIC BLOOD PRESSURE: 182 MMHG

## 2021-12-26 RX ADMIN — Medication SCH CAP: at 08:15

## 2021-12-26 RX ADMIN — INSULIN LISPRO SCH UNITS: 100 INJECTION, SOLUTION INTRAVENOUS; SUBCUTANEOUS at 17:00

## 2021-12-26 RX ADMIN — BUDESONIDE PRN MG: 0.5 SUSPENSION RESPIRATORY (INHALATION) at 08:15

## 2021-12-26 RX ADMIN — Medication SCH CAP: at 19:56

## 2021-12-26 RX ADMIN — LOSARTAN POTASSIUM SCH MG: 50 TABLET, FILM COATED ORAL at 10:03

## 2021-12-26 RX ADMIN — INSULIN LISPRO SCH UNITS: 100 INJECTION, SOLUTION INTRAVENOUS; SUBCUTANEOUS at 08:00

## 2021-12-26 RX ADMIN — INSULIN LISPRO SCH UNITS: 100 INJECTION, SOLUTION INTRAVENOUS; SUBCUTANEOUS at 12:00

## 2021-12-26 NOTE — PN
DATE: 12/26/2021

ATTENDING PHYSICIAN:  Dr. Rizo.



SUBJECTIVE:  She is doing better.  She is still a little bit dyspneic with 

exertion, but comfortable at rest.



OBJECTIVE FINDINGS:

VITAL SIGNS:  Blood pressure this morning is 123/97, pulse is 60 and regular.  

She is afebrile.  Oxygen saturation 93% on 8 liters by nasal cannula.

HEENT:  Head is without trauma.  Pupils are reactive.  Sclerae nonicteric.  

Oropharynx clear.

NECK:  Supple.

LUNGS:  Good air movement.

CARDIOVASCULAR:  Showed regular heart tones.  No gallop.

ABDOMEN:  Soft.

EXTREMITIES:  Without edema.

NEUROLOGIC:  Focally intact.

SKIN:  Warm and dry.



ASSESSMENT:

1.  A 76-year-old female with COVID-19 pneumonia bilaterally.

2.  Acute respiratory failure with hypoxemia requiring high dose oxygen still.

3.  Labile hypertension.



PLAN:

1.  Continue weaning down oxygen demands to maintain sats above 90%.

2.  Diet as tolerated.

3.  Keep ICU status.

4.  Discharge planning with supplemental oxygen in planning status.







ELI

DR: Zeke   DD: 12/26/2021 09:10

DT: 12/26/2021 09:29   TID: [id number]

CC:     Destiny Gray MD

## 2021-12-27 VITALS — SYSTOLIC BLOOD PRESSURE: 135 MMHG | DIASTOLIC BLOOD PRESSURE: 58 MMHG

## 2021-12-27 VITALS — DIASTOLIC BLOOD PRESSURE: 77 MMHG | SYSTOLIC BLOOD PRESSURE: 169 MMHG

## 2021-12-27 VITALS — DIASTOLIC BLOOD PRESSURE: 78 MMHG | SYSTOLIC BLOOD PRESSURE: 169 MMHG

## 2021-12-27 VITALS — SYSTOLIC BLOOD PRESSURE: 178 MMHG | DIASTOLIC BLOOD PRESSURE: 87 MMHG

## 2021-12-27 VITALS — DIASTOLIC BLOOD PRESSURE: 82 MMHG | SYSTOLIC BLOOD PRESSURE: 173 MMHG

## 2021-12-27 VITALS — DIASTOLIC BLOOD PRESSURE: 79 MMHG | SYSTOLIC BLOOD PRESSURE: 149 MMHG

## 2021-12-27 VITALS — DIASTOLIC BLOOD PRESSURE: 93 MMHG | SYSTOLIC BLOOD PRESSURE: 174 MMHG

## 2021-12-27 VITALS — DIASTOLIC BLOOD PRESSURE: 75 MMHG | SYSTOLIC BLOOD PRESSURE: 161 MMHG

## 2021-12-27 VITALS — SYSTOLIC BLOOD PRESSURE: 138 MMHG | DIASTOLIC BLOOD PRESSURE: 82 MMHG

## 2021-12-27 VITALS — DIASTOLIC BLOOD PRESSURE: 76 MMHG | SYSTOLIC BLOOD PRESSURE: 141 MMHG

## 2021-12-27 VITALS — DIASTOLIC BLOOD PRESSURE: 72 MMHG | SYSTOLIC BLOOD PRESSURE: 159 MMHG

## 2021-12-27 VITALS — SYSTOLIC BLOOD PRESSURE: 167 MMHG | DIASTOLIC BLOOD PRESSURE: 77 MMHG

## 2021-12-27 VITALS — SYSTOLIC BLOOD PRESSURE: 170 MMHG | DIASTOLIC BLOOD PRESSURE: 39 MMHG

## 2021-12-27 VITALS — SYSTOLIC BLOOD PRESSURE: 153 MMHG | DIASTOLIC BLOOD PRESSURE: 67 MMHG

## 2021-12-27 VITALS — SYSTOLIC BLOOD PRESSURE: 173 MMHG | DIASTOLIC BLOOD PRESSURE: 84 MMHG

## 2021-12-27 VITALS — SYSTOLIC BLOOD PRESSURE: 161 MMHG | DIASTOLIC BLOOD PRESSURE: 70 MMHG

## 2021-12-27 VITALS — DIASTOLIC BLOOD PRESSURE: 60 MMHG | SYSTOLIC BLOOD PRESSURE: 133 MMHG

## 2021-12-27 VITALS — DIASTOLIC BLOOD PRESSURE: 87 MMHG | SYSTOLIC BLOOD PRESSURE: 168 MMHG

## 2021-12-27 VITALS — SYSTOLIC BLOOD PRESSURE: 175 MMHG | DIASTOLIC BLOOD PRESSURE: 94 MMHG

## 2021-12-27 VITALS — SYSTOLIC BLOOD PRESSURE: 141 MMHG | DIASTOLIC BLOOD PRESSURE: 73 MMHG

## 2021-12-27 VITALS — SYSTOLIC BLOOD PRESSURE: 139 MMHG | DIASTOLIC BLOOD PRESSURE: 73 MMHG

## 2021-12-27 VITALS — DIASTOLIC BLOOD PRESSURE: 67 MMHG | SYSTOLIC BLOOD PRESSURE: 148 MMHG

## 2021-12-27 VITALS — SYSTOLIC BLOOD PRESSURE: 143 MMHG | DIASTOLIC BLOOD PRESSURE: 78 MMHG

## 2021-12-27 VITALS — SYSTOLIC BLOOD PRESSURE: 167 MMHG | DIASTOLIC BLOOD PRESSURE: 73 MMHG

## 2021-12-27 VITALS — SYSTOLIC BLOOD PRESSURE: 153 MMHG | DIASTOLIC BLOOD PRESSURE: 86 MMHG

## 2021-12-27 RX ADMIN — ZINC SULFATE CAP 220 MG (50 MG ELEMENTAL ZN) SCH MG: 220 (50 ZN) CAP at 10:18

## 2021-12-27 RX ADMIN — Medication SCH CAP: at 10:17

## 2021-12-27 RX ADMIN — INSULIN LISPRO SCH UNITS: 100 INJECTION, SOLUTION INTRAVENOUS; SUBCUTANEOUS at 08:00

## 2021-12-27 RX ADMIN — Medication SCH MG: at 10:17

## 2021-12-27 RX ADMIN — Medication SCH CAP: at 20:00

## 2021-12-27 RX ADMIN — FAMOTIDINE SCH MG: 20 TABLET ORAL at 10:17

## 2021-12-27 RX ADMIN — FAMOTIDINE SCH MG: 20 TABLET ORAL at 20:00

## 2021-12-27 RX ADMIN — FAMOTIDINE SCH MG: 20 TABLET ORAL at 21:00

## 2021-12-27 RX ADMIN — CHOLECALCIFEROL CAP 1.25 MG (50000 UNIT) SCH UNIT: 1.25 CAP at 17:34

## 2021-12-27 RX ADMIN — LOSARTAN POTASSIUM SCH MG: 50 TABLET, FILM COATED ORAL at 10:18

## 2021-12-27 RX ADMIN — DEXAMETHASONE SODIUM PHOSPHATE SCH MG: 4 INJECTION, SOLUTION INTRAMUSCULAR; INTRAVENOUS at 13:00

## 2021-12-27 RX ADMIN — Medication SCH CAP: at 21:00

## 2021-12-27 RX ADMIN — INSULIN LISPRO SCH UNITS: 100 INJECTION, SOLUTION INTRAVENOUS; SUBCUTANEOUS at 17:00

## 2021-12-27 RX ADMIN — INSULIN LISPRO SCH UNITS: 100 INJECTION, SOLUTION INTRAVENOUS; SUBCUTANEOUS at 12:00

## 2021-12-27 NOTE — PN
DATE: 12/27/2021

ATTENDING PHYSICIAN:  Dr. Rizo.



SUBJECTIVE:  Still dyspneic with minimal exertion.  Oxygen requirements are 

still high with exertion.



OBJECTIVE FINDINGS:

VITAL SIGNS:  Blood pressure this morning is 149/76, pulse is 66 and regular.  

She is afebrile.  Oxygen saturation is 92% on 15 liters.

HEENT:  Head is without trauma.  Pupils are reactive.  Sclerae nonicteric.  The 

oropharynx clear.

NECK:  Supple, no bruits.

LUNGS:  Coarse rhonchi bilaterally.

CARDIOVASCULAR:  Showed regular heart tones.

ABDOMEN:  Soft.

EXTREMITIES:  Without edema.



ASSESSMENT:

1.  A 76-year-old female with COVID-19 pneumonia bilaterally.

2.  Acute respiratory failure with hypoxemia requiring high flow oxygen still.

3.  Labile hypertension, improved back on meds.



PLAN:

1.  Continue weaning down oxygen demands.

2.  Empiric Lovenox has been reinitiated.

3.  Keep ICU status.

4.  Diet as tolerated.







NAYELI/JAGDEEP

DR: NAYELI/gilmer   DD: 12/27/2021 11:20

DT: 12/27/2021 12:12   TID: [id number]

## 2021-12-28 VITALS — SYSTOLIC BLOOD PRESSURE: 151 MMHG | DIASTOLIC BLOOD PRESSURE: 76 MMHG

## 2021-12-28 VITALS — SYSTOLIC BLOOD PRESSURE: 165 MMHG | DIASTOLIC BLOOD PRESSURE: 72 MMHG

## 2021-12-28 VITALS — DIASTOLIC BLOOD PRESSURE: 66 MMHG | SYSTOLIC BLOOD PRESSURE: 134 MMHG

## 2021-12-28 VITALS — SYSTOLIC BLOOD PRESSURE: 138 MMHG | DIASTOLIC BLOOD PRESSURE: 71 MMHG

## 2021-12-28 VITALS — SYSTOLIC BLOOD PRESSURE: 169 MMHG | DIASTOLIC BLOOD PRESSURE: 87 MMHG

## 2021-12-28 VITALS — DIASTOLIC BLOOD PRESSURE: 82 MMHG | SYSTOLIC BLOOD PRESSURE: 177 MMHG

## 2021-12-28 VITALS — DIASTOLIC BLOOD PRESSURE: 84 MMHG | SYSTOLIC BLOOD PRESSURE: 170 MMHG

## 2021-12-28 VITALS — SYSTOLIC BLOOD PRESSURE: 153 MMHG | DIASTOLIC BLOOD PRESSURE: 73 MMHG

## 2021-12-28 VITALS — DIASTOLIC BLOOD PRESSURE: 70 MMHG | SYSTOLIC BLOOD PRESSURE: 159 MMHG

## 2021-12-28 VITALS — DIASTOLIC BLOOD PRESSURE: 81 MMHG | SYSTOLIC BLOOD PRESSURE: 181 MMHG

## 2021-12-28 VITALS — SYSTOLIC BLOOD PRESSURE: 182 MMHG | DIASTOLIC BLOOD PRESSURE: 90 MMHG

## 2021-12-28 VITALS — DIASTOLIC BLOOD PRESSURE: 90 MMHG | SYSTOLIC BLOOD PRESSURE: 188 MMHG

## 2021-12-28 VITALS — DIASTOLIC BLOOD PRESSURE: 73 MMHG | SYSTOLIC BLOOD PRESSURE: 156 MMHG

## 2021-12-28 VITALS — DIASTOLIC BLOOD PRESSURE: 83 MMHG | SYSTOLIC BLOOD PRESSURE: 169 MMHG

## 2021-12-28 VITALS — SYSTOLIC BLOOD PRESSURE: 128 MMHG | DIASTOLIC BLOOD PRESSURE: 59 MMHG

## 2021-12-28 VITALS — DIASTOLIC BLOOD PRESSURE: 89 MMHG | SYSTOLIC BLOOD PRESSURE: 166 MMHG

## 2021-12-28 VITALS — DIASTOLIC BLOOD PRESSURE: 87 MMHG | SYSTOLIC BLOOD PRESSURE: 182 MMHG

## 2021-12-28 VITALS — SYSTOLIC BLOOD PRESSURE: 132 MMHG | DIASTOLIC BLOOD PRESSURE: 76 MMHG

## 2021-12-28 VITALS — DIASTOLIC BLOOD PRESSURE: 70 MMHG | SYSTOLIC BLOOD PRESSURE: 150 MMHG

## 2021-12-28 VITALS — DIASTOLIC BLOOD PRESSURE: 90 MMHG | SYSTOLIC BLOOD PRESSURE: 189 MMHG

## 2021-12-28 VITALS — DIASTOLIC BLOOD PRESSURE: 76 MMHG | SYSTOLIC BLOOD PRESSURE: 146 MMHG

## 2021-12-28 VITALS — DIASTOLIC BLOOD PRESSURE: 72 MMHG | SYSTOLIC BLOOD PRESSURE: 129 MMHG

## 2021-12-28 VITALS — SYSTOLIC BLOOD PRESSURE: 186 MMHG | DIASTOLIC BLOOD PRESSURE: 87 MMHG

## 2021-12-28 LAB
ALBUMIN SERPL-MCNC: 2.6 G/DL (ref 3.4–5)
ALBUMIN/GLOB SERPL: 0.6 {RATIO} (ref 1–1.7)
ALP SERPL-CCNC: 233 U/L (ref 46–116)
ALT SERPL-CCNC: 148 U/L (ref 14–59)
ANION GAP SERPL CALC-SCNC: 7 MMOL/L (ref 6–14)
AST SERPL-CCNC: 73 U/L (ref 15–37)
BASOPHILS # BLD AUTO: 0.1 X10^3/UL (ref 0–0.2)
BASOPHILS NFR BLD: 1 % (ref 0–3)
BILIRUB SERPL-MCNC: 0.8 MG/DL (ref 0.2–1)
BUN/CREAT SERPL: 24 (ref 6–20)
CA-I SERPL ISE-MCNC: 22 MG/DL (ref 7–20)
CALCIUM SERPL-MCNC: 8.6 MG/DL (ref 8.5–10.1)
CHLORIDE SERPL-SCNC: 107 MMOL/L (ref 98–107)
CO2 SERPL-SCNC: 30 MMOL/L (ref 21–32)
CREAT SERPL-MCNC: 0.9 MG/DL (ref 0.6–1)
EOSINOPHIL NFR BLD: 0.1 X10^3/UL (ref 0–0.7)
EOSINOPHIL NFR BLD: 1 % (ref 0–3)
ERYTHROCYTE [DISTWIDTH] IN BLOOD BY AUTOMATED COUNT: 14.3 % (ref 11.5–14.5)
GFR SERPLBLD BASED ON 1.73 SQ M-ARVRAT: 61.2 ML/MIN
GLOBULIN SER-MCNC: 4.2 G/DL (ref 2.2–3.8)
GLUCOSE SERPL-MCNC: 108 MG/DL (ref 70–99)
HCT VFR BLD CALC: 37.6 % (ref 36–47)
HGB BLD-MCNC: 12.5 G/DL (ref 12–15.5)
LYMPHOCYTES # BLD: 2.5 X10^3/UL (ref 1–4.8)
LYMPHOCYTES NFR BLD AUTO: 18 % (ref 24–48)
MCH RBC QN AUTO: 30 PG (ref 25–35)
MCHC RBC AUTO-ENTMCNC: 33 G/DL (ref 31–37)
MCV RBC AUTO: 91 FL (ref 79–100)
MONO #: 1.3 X10^3/UL (ref 0–1.1)
MONOCYTES NFR BLD: 9 % (ref 0–9)
NEUT #: 9.9 X10^3UL (ref 1.8–7.7)
NEUTROPHILS NFR BLD AUTO: 71 % (ref 31–73)
PLATELET # BLD AUTO: 243 X10^3/UL (ref 140–400)
POTASSIUM SERPL-SCNC: 3.5 MMOL/L (ref 3.5–5.1)
PROT SERPL-MCNC: 6.8 G/DL (ref 6.4–8.2)
RBC # BLD AUTO: 4.13 X10^6/UL (ref 3.5–5.4)
SODIUM SERPL-SCNC: 144 MMOL/L (ref 136–145)
WBC # BLD AUTO: 13.9 X10^3/UL (ref 4–11)

## 2021-12-28 RX ADMIN — Medication SCH CAP: at 08:40

## 2021-12-28 RX ADMIN — ENOXAPARIN SODIUM SCH MG: 100 INJECTION SUBCUTANEOUS at 16:47

## 2021-12-28 RX ADMIN — INSULIN LISPRO SCH UNITS: 100 INJECTION, SOLUTION INTRAVENOUS; SUBCUTANEOUS at 16:56

## 2021-12-28 RX ADMIN — Medication SCH MG: at 08:40

## 2021-12-28 RX ADMIN — DEXAMETHASONE SODIUM PHOSPHATE SCH MG: 4 INJECTION, SOLUTION INTRAMUSCULAR; INTRAVENOUS at 08:42

## 2021-12-28 RX ADMIN — LOSARTAN POTASSIUM SCH MG: 50 TABLET, FILM COATED ORAL at 08:40

## 2021-12-28 RX ADMIN — INSULIN LISPRO SCH UNITS: 100 INJECTION, SOLUTION INTRAVENOUS; SUBCUTANEOUS at 12:03

## 2021-12-28 RX ADMIN — ZINC SULFATE CAP 220 MG (50 MG ELEMENTAL ZN) SCH MG: 220 (50 ZN) CAP at 08:39

## 2021-12-28 RX ADMIN — FAMOTIDINE SCH MG: 20 TABLET ORAL at 20:56

## 2021-12-28 RX ADMIN — Medication SCH CAP: at 20:56

## 2021-12-28 RX ADMIN — FAMOTIDINE SCH MG: 20 TABLET ORAL at 08:40

## 2021-12-28 NOTE — PN
DATE: 12/28/2021

ATTENDING PHYSICIAN:  Dr. Rizo.



SUBJECTIVE:  Feeling better.  She is up and about.  She is less dyspneic.  She 

is requiring less oxygen.



OBJECTIVE FINDINGS:

VITAL SIGNS:  Blood pressure this morning is 129/71, pulse is 80 and regular. BP

is much improved. Oxygen saturation 92% down to 10 liters.

HEENT:  Head is without trauma.  Pupils are reactive.  Sclerae nonicteric.  

Oropharynx clear.

NECK:  Supple, no bruits identified.

LUNGS:  Good breath sounds.

CARDIOVASCULAR:  Showed regular heart tones.

ABDOMEN:  Soft.

EXTREMITIES:  Without edema.

SKIN:  Warm and dry.



ASSESSMENT:

1.  This 76-year-old female has COVID-19 pneumonia bilaterally.

2.  Acute respiratory failure, hypoxemia, improving as oxygen demands are 

lessening.

3.  Labile hypertension, now normotensive, back on meds.



PLAN:

1.  Continue weaning down oxygen demands.

2.  She can go home as early as tomorrow with supplemental oxygen.

3.  BP meds as ordered.







NAYELI/DEYSI/DENNIS

DR: NAYELI/gilmer   DD: 12/28/2021 10:52

DT: 12/28/2021 11:27   TID: [id number]

CC:     Destiny Gray MD

## 2021-12-29 VITALS — DIASTOLIC BLOOD PRESSURE: 72 MMHG | SYSTOLIC BLOOD PRESSURE: 161 MMHG

## 2021-12-29 VITALS — DIASTOLIC BLOOD PRESSURE: 88 MMHG | SYSTOLIC BLOOD PRESSURE: 171 MMHG

## 2021-12-29 VITALS — DIASTOLIC BLOOD PRESSURE: 87 MMHG | SYSTOLIC BLOOD PRESSURE: 186 MMHG

## 2021-12-29 VITALS — DIASTOLIC BLOOD PRESSURE: 73 MMHG | SYSTOLIC BLOOD PRESSURE: 182 MMHG

## 2021-12-29 VITALS — DIASTOLIC BLOOD PRESSURE: 81 MMHG | SYSTOLIC BLOOD PRESSURE: 168 MMHG

## 2021-12-29 VITALS — DIASTOLIC BLOOD PRESSURE: 91 MMHG | SYSTOLIC BLOOD PRESSURE: 168 MMHG

## 2021-12-29 VITALS — DIASTOLIC BLOOD PRESSURE: 83 MMHG | SYSTOLIC BLOOD PRESSURE: 169 MMHG

## 2021-12-29 VITALS — SYSTOLIC BLOOD PRESSURE: 168 MMHG | DIASTOLIC BLOOD PRESSURE: 75 MMHG

## 2021-12-29 VITALS — DIASTOLIC BLOOD PRESSURE: 76 MMHG | SYSTOLIC BLOOD PRESSURE: 161 MMHG

## 2021-12-29 VITALS — DIASTOLIC BLOOD PRESSURE: 78 MMHG | SYSTOLIC BLOOD PRESSURE: 162 MMHG

## 2021-12-29 VITALS — SYSTOLIC BLOOD PRESSURE: 177 MMHG | DIASTOLIC BLOOD PRESSURE: 80 MMHG

## 2021-12-29 VITALS — DIASTOLIC BLOOD PRESSURE: 104 MMHG | SYSTOLIC BLOOD PRESSURE: 167 MMHG

## 2021-12-29 VITALS — SYSTOLIC BLOOD PRESSURE: 180 MMHG | DIASTOLIC BLOOD PRESSURE: 88 MMHG

## 2021-12-29 VITALS — SYSTOLIC BLOOD PRESSURE: 183 MMHG | DIASTOLIC BLOOD PRESSURE: 91 MMHG

## 2021-12-29 VITALS — DIASTOLIC BLOOD PRESSURE: 89 MMHG | SYSTOLIC BLOOD PRESSURE: 189 MMHG

## 2021-12-29 VITALS — DIASTOLIC BLOOD PRESSURE: 89 MMHG | SYSTOLIC BLOOD PRESSURE: 178 MMHG

## 2021-12-29 VITALS — SYSTOLIC BLOOD PRESSURE: 176 MMHG | DIASTOLIC BLOOD PRESSURE: 86 MMHG

## 2021-12-29 VITALS — DIASTOLIC BLOOD PRESSURE: 76 MMHG | SYSTOLIC BLOOD PRESSURE: 154 MMHG

## 2021-12-29 VITALS — SYSTOLIC BLOOD PRESSURE: 178 MMHG | DIASTOLIC BLOOD PRESSURE: 94 MMHG

## 2021-12-29 VITALS — SYSTOLIC BLOOD PRESSURE: 153 MMHG | DIASTOLIC BLOOD PRESSURE: 80 MMHG

## 2021-12-29 VITALS — DIASTOLIC BLOOD PRESSURE: 78 MMHG | SYSTOLIC BLOOD PRESSURE: 168 MMHG

## 2021-12-29 VITALS — SYSTOLIC BLOOD PRESSURE: 145 MMHG | DIASTOLIC BLOOD PRESSURE: 81 MMHG

## 2021-12-29 VITALS — DIASTOLIC BLOOD PRESSURE: 93 MMHG | SYSTOLIC BLOOD PRESSURE: 189 MMHG

## 2021-12-29 LAB
% LYMPHS: 7 % (ref 24–48)
% MONOS: 8 % (ref 0–10)
% SEGS: 85 % (ref 35–66)
ANION GAP SERPL CALC-SCNC: 12 MMOL/L (ref 6–14)
BASOPHILS # BLD AUTO: 0 X10^3/UL (ref 0–0.2)
BASOPHILS NFR BLD: 0 % (ref 0–3)
CA-I SERPL ISE-MCNC: 29 MG/DL (ref 7–20)
CALCIUM SERPL-MCNC: 9.1 MG/DL (ref 8.5–10.1)
CHLORIDE SERPL-SCNC: 107 MMOL/L (ref 98–107)
CO2 SERPL-SCNC: 25 MMOL/L (ref 21–32)
CREAT SERPL-MCNC: 1 MG/DL (ref 0.6–1)
EOSINOPHIL NFR BLD: 0 % (ref 0–3)
EOSINOPHIL NFR BLD: 0 X10^3/UL (ref 0–0.7)
ERYTHROCYTE [DISTWIDTH] IN BLOOD BY AUTOMATED COUNT: 14.2 % (ref 11.5–14.5)
GFR SERPLBLD BASED ON 1.73 SQ M-ARVRAT: 54.2 ML/MIN
GLUCOSE SERPL-MCNC: 140 MG/DL (ref 70–99)
HCT VFR BLD CALC: 40.3 % (ref 36–47)
HGB BLD-MCNC: 13.1 G/DL (ref 12–15.5)
LYMPHOCYTES # BLD: 2 X10^3/UL (ref 1–4.8)
LYMPHOCYTES NFR BLD AUTO: 10 % (ref 24–48)
MCH RBC QN AUTO: 30 PG (ref 25–35)
MCHC RBC AUTO-ENTMCNC: 33 G/DL (ref 31–37)
MCV RBC AUTO: 92 FL (ref 79–100)
MONO #: 1.1 X10^3/UL (ref 0–1.1)
MONOCYTES NFR BLD: 6 % (ref 0–9)
NEUT #: 16.6 X10^3UL (ref 1.8–7.7)
NEUTROPHILS NFR BLD AUTO: 84 % (ref 31–73)
PLATELET # BLD AUTO: 261 X10^3/UL (ref 140–400)
PLATELET # BLD EST: ADEQUATE 10*3/UL
POTASSIUM SERPL-SCNC: 3.7 MMOL/L (ref 3.5–5.1)
RBC # BLD AUTO: 4.4 X10^6/UL (ref 3.5–5.4)
SODIUM SERPL-SCNC: 144 MMOL/L (ref 136–145)
WBC # BLD AUTO: 19.7 X10^3/UL (ref 4–11)

## 2021-12-29 RX ADMIN — FAMOTIDINE SCH MG: 20 TABLET ORAL at 20:51

## 2021-12-29 RX ADMIN — INSULIN LISPRO SCH UNITS: 100 INJECTION, SOLUTION INTRAVENOUS; SUBCUTANEOUS at 17:13

## 2021-12-29 RX ADMIN — METOPROLOL TARTRATE SCH MG: 25 TABLET, FILM COATED ORAL at 20:50

## 2021-12-29 RX ADMIN — FAMOTIDINE SCH MG: 20 TABLET ORAL at 08:40

## 2021-12-29 RX ADMIN — BUDESONIDE PRN MG: 0.5 SUSPENSION RESPIRATORY (INHALATION) at 08:39

## 2021-12-29 RX ADMIN — LOSARTAN POTASSIUM SCH MG: 50 TABLET, FILM COATED ORAL at 08:40

## 2021-12-29 RX ADMIN — DEXAMETHASONE SODIUM PHOSPHATE SCH MG: 4 INJECTION, SOLUTION INTRAMUSCULAR; INTRAVENOUS at 08:40

## 2021-12-29 RX ADMIN — ENOXAPARIN SODIUM SCH MG: 100 INJECTION SUBCUTANEOUS at 17:15

## 2021-12-29 RX ADMIN — Medication SCH CAP: at 08:40

## 2021-12-29 RX ADMIN — INSULIN LISPRO SCH UNITS: 100 INJECTION, SOLUTION INTRAVENOUS; SUBCUTANEOUS at 07:59

## 2021-12-29 RX ADMIN — Medication SCH CAP: at 20:51

## 2021-12-29 RX ADMIN — Medication SCH MG: at 08:40

## 2021-12-29 RX ADMIN — ZINC SULFATE CAP 220 MG (50 MG ELEMENTAL ZN) SCH MG: 220 (50 ZN) CAP at 08:40

## 2021-12-29 RX ADMIN — INSULIN LISPRO SCH UNITS: 100 INJECTION, SOLUTION INTRAVENOUS; SUBCUTANEOUS at 12:00

## 2021-12-29 NOTE — RAD
EXAM: Chest, single view.



HISTORY: Shortness of breath.



COMPARISON: 12/23/2021



FINDINGS: A frontal view of the chest is obtained. There has been interval increase in diffuse inters
titial and alveolar infiltrate. There are suspected small pleural effusions. There is no pneumothorax
. There is a stable cardiac silhouette.



IMPRESSION: Increase in diffuse mixed interstitial and alveolar infiltrate.



Electronically signed by: Gisela Rouse MD (12/29/2021 2:35 PM) [id number]

## 2021-12-30 VITALS — SYSTOLIC BLOOD PRESSURE: 166 MMHG | DIASTOLIC BLOOD PRESSURE: 92 MMHG

## 2021-12-30 VITALS — DIASTOLIC BLOOD PRESSURE: 69 MMHG | SYSTOLIC BLOOD PRESSURE: 177 MMHG

## 2021-12-30 VITALS — SYSTOLIC BLOOD PRESSURE: 162 MMHG | DIASTOLIC BLOOD PRESSURE: 77 MMHG

## 2021-12-30 VITALS — DIASTOLIC BLOOD PRESSURE: 79 MMHG | SYSTOLIC BLOOD PRESSURE: 177 MMHG

## 2021-12-30 VITALS — DIASTOLIC BLOOD PRESSURE: 83 MMHG | SYSTOLIC BLOOD PRESSURE: 160 MMHG

## 2021-12-30 VITALS — DIASTOLIC BLOOD PRESSURE: 80 MMHG | SYSTOLIC BLOOD PRESSURE: 157 MMHG

## 2021-12-30 VITALS — SYSTOLIC BLOOD PRESSURE: 165 MMHG | DIASTOLIC BLOOD PRESSURE: 74 MMHG

## 2021-12-30 VITALS — SYSTOLIC BLOOD PRESSURE: 160 MMHG | DIASTOLIC BLOOD PRESSURE: 74 MMHG

## 2021-12-30 VITALS — DIASTOLIC BLOOD PRESSURE: 76 MMHG | SYSTOLIC BLOOD PRESSURE: 159 MMHG

## 2021-12-30 VITALS — DIASTOLIC BLOOD PRESSURE: 72 MMHG | SYSTOLIC BLOOD PRESSURE: 153 MMHG

## 2021-12-30 VITALS — DIASTOLIC BLOOD PRESSURE: 66 MMHG | SYSTOLIC BLOOD PRESSURE: 154 MMHG

## 2021-12-30 VITALS — DIASTOLIC BLOOD PRESSURE: 69 MMHG | SYSTOLIC BLOOD PRESSURE: 154 MMHG

## 2021-12-30 VITALS — DIASTOLIC BLOOD PRESSURE: 83 MMHG | SYSTOLIC BLOOD PRESSURE: 173 MMHG

## 2021-12-30 VITALS — DIASTOLIC BLOOD PRESSURE: 78 MMHG | SYSTOLIC BLOOD PRESSURE: 147 MMHG

## 2021-12-30 VITALS — SYSTOLIC BLOOD PRESSURE: 145 MMHG | DIASTOLIC BLOOD PRESSURE: 55 MMHG

## 2021-12-30 VITALS — DIASTOLIC BLOOD PRESSURE: 89 MMHG | SYSTOLIC BLOOD PRESSURE: 162 MMHG

## 2021-12-30 VITALS — DIASTOLIC BLOOD PRESSURE: 70 MMHG | SYSTOLIC BLOOD PRESSURE: 155 MMHG

## 2021-12-30 VITALS — SYSTOLIC BLOOD PRESSURE: 146 MMHG | DIASTOLIC BLOOD PRESSURE: 72 MMHG

## 2021-12-30 VITALS — SYSTOLIC BLOOD PRESSURE: 179 MMHG | DIASTOLIC BLOOD PRESSURE: 82 MMHG

## 2021-12-30 VITALS — DIASTOLIC BLOOD PRESSURE: 79 MMHG | SYSTOLIC BLOOD PRESSURE: 163 MMHG

## 2021-12-30 VITALS — DIASTOLIC BLOOD PRESSURE: 61 MMHG | SYSTOLIC BLOOD PRESSURE: 153 MMHG

## 2021-12-30 VITALS — DIASTOLIC BLOOD PRESSURE: 71 MMHG | SYSTOLIC BLOOD PRESSURE: 168 MMHG

## 2021-12-30 RX ADMIN — METOPROLOL TARTRATE SCH MG: 50 TABLET, FILM COATED ORAL at 21:00

## 2021-12-30 RX ADMIN — Medication SCH MG: at 07:55

## 2021-12-30 RX ADMIN — INSULIN LISPRO SCH UNITS: 100 INJECTION, SOLUTION INTRAVENOUS; SUBCUTANEOUS at 12:07

## 2021-12-30 RX ADMIN — DEXAMETHASONE SODIUM PHOSPHATE SCH MG: 4 INJECTION, SOLUTION INTRAMUSCULAR; INTRAVENOUS at 07:56

## 2021-12-30 RX ADMIN — AZITHROMYCIN SCH MG: 250 TABLET, FILM COATED ORAL at 07:55

## 2021-12-30 RX ADMIN — LOSARTAN POTASSIUM SCH MG: 50 TABLET, FILM COATED ORAL at 08:15

## 2021-12-30 RX ADMIN — INSULIN LISPRO SCH UNITS: 100 INJECTION, SOLUTION INTRAVENOUS; SUBCUTANEOUS at 08:00

## 2021-12-30 RX ADMIN — FAMOTIDINE SCH MG: 20 TABLET ORAL at 21:46

## 2021-12-30 RX ADMIN — METOPROLOL TARTRATE SCH MG: 25 TABLET, FILM COATED ORAL at 08:15

## 2021-12-30 RX ADMIN — FAMOTIDINE SCH MG: 20 TABLET ORAL at 07:55

## 2021-12-30 RX ADMIN — ENOXAPARIN SODIUM SCH MG: 100 INJECTION SUBCUTANEOUS at 17:06

## 2021-12-30 RX ADMIN — Medication SCH CAP: at 07:55

## 2021-12-30 RX ADMIN — Medication SCH CAP: at 21:46

## 2021-12-30 RX ADMIN — ZINC SULFATE CAP 220 MG (50 MG ELEMENTAL ZN) SCH MG: 220 (50 ZN) CAP at 07:55

## 2021-12-30 RX ADMIN — INSULIN LISPRO SCH UNITS: 100 INJECTION, SOLUTION INTRAVENOUS; SUBCUTANEOUS at 17:34

## 2021-12-30 NOTE — PN
DATE: 12/29/2021

SUBJECTIVE:  The patient is sitting comfortably in her chair in no apparent 

distress.  She denied any complaint, in particular denied chest pain, shortness 

of breath, cough, phlegm or hemoptysis.  She does desaturate and does complain 

of shortness of breath on exertion.  Actually, her oxygen requirement is coming 

down.  She was requiring 30 liters initially when she came in and  now she is 

down to 8 liters.



PHYSICAL EXAMINATION:

GENERAL:  When I examined her this afternoon, she looked well and was clearly in

no apparent respiratory distress.  No pallor, jaundice, cyanosis or thyromegaly.

 No jugular venous distention.  No lower limb edema.

VITAL SIGNS:  Her heart rate was 88, blood pressure was 178/89, temperature was 

97.9, respiratory rate was 22 and oxygen saturation was 95% on 8 liters of 

oxygen.

HEAD, EYES, EARS, NOSE, AND THROAT:  Normocephalic, atraumatic.

NECK:  Supple.

HEART:  Showed normal first and second heart sounds.  No gallop, rub or murmur.

CHEST:  Shows central trachea, equal bilateral chest expansion and air entry, 

vesicular breath sounds with early coarse inspiratory crackles.  I could not 

appreciate any rhonchi.

ABDOMEN:  Distended, soft, nontender.

NEUROLOGIC:  She was grossly intact.



Her intake over the last 24 hours and output are incompletely recorded.



LABORATORY DATA:  This morning showed a white cell count of 19,700, hemoglobin 

13, hematocrit 40, MCV 92 and platelet count 261,000.  Her chemistry showed a 

serum sodium of 144, potassium 3.7, chloride 107, bicarbonate 25, anion gap of 

12, BUN 29, creatinine 1, estimated GFR was 54 mL per minute, her glucose 114 

and calcium was 9.1.



ASSESSMENT:

1.  COVID-19 pneumonia.

2.  Acute hypoxic respiratory failure.

3.  Hypertension.

4.  Most likely an underlying chronic interstitial lung disease due to perhaps 

____ disease.



PLAN:  To continue with DVT prophylaxis.  Continue with steroids.  Continue with

dexamethasone.  I will probably add beta blockers to control her blood pressure 

better and once her oxygen requirement is around 5 liters, especially on 

exertion, the patient can be discharged home.  I recommended that the patient 

should get rid of all her birds as she probably has some underlying pulmonary 

fibrosis due to exposure to bird droplet as she used to have pigeons that she 

was advised before to get rid of.







AMM/PATTI/CARRIE

DR: AMM/nts   DD: 12/29/2021 13:23

DT: 12/29/2021 23:40   TID: [id number]

## 2021-12-30 NOTE — PN
DATE: 12/30/2021

SUBJECTIVE:  The patient is sitting comfortably in her bed, propped up, in no 

apparent distress.  She is maintaining her oxygen saturation at 95% on 10 liters

of oxygen.  She did desaturate down to mid 80s on exertion and her oxygen flow 

rate was increased to 10 liters this  morning.  Denied any chest pain, has 

occasional cough.



PHYSICAL EXAMINATION:

GENERAL:  When I examined her, there was no pallor, jaundice, cyanosis or 

thyromegaly.  No jugular venous distention.  No limb edema.

VITAL SIGNS:  Her heart rate was 75, blood pressure was 159/76, temperature was 

98.6, respiratory rate was 16 and oxygen saturation was 95% on 10 liters of 

oxygen.

HEAD, EYES, EARS, NOSE, AND THROAT:  Normocephalic, atraumatic.

NECK:  Supple.

HEART:  Showed normal first and second heart sounds.  No gallop, rub or murmur.

CHEST:  Shows central trachea, equal bilateral chest expansion, air entry, 

vesicular breath sounds with bilateral early coarse inspiratory crackles in both

sides.  Posteriorly, there is no rhonchi.

ABDOMEN:  Distended, soft, nontender.

NEUROLOGIC:  She was grossly intact.



Her intake was 1900, no output was recorded.



LABORATORY DATA:  As of yesterday showed a white cell count of 19,700, 

hemoglobin 13, hematocrit 40, MCV 92 and platelet count 261,000.  Her chemistry 

showed a serum sodium of 144, potassium 3.7, chloride 107, bicarbonate 25, anion

gap of 12, BUN 29, creatinine 1.  Estimated GFR was 54 mL per minute.  Her 

glucose 140, calcium was 9.1.



ASSESSMENT:

1.  COVID-19 pneumonia.

2.  Acute hypoxic respiratory failure.

3.  Hypertension.

4.  The patient likely has underlying chronic interstitial lung disease as she 

has before pigeons and other types of birds and she was advised before to get 

rid of them.



PLAN:  My plan is to continue with steroids, in the form of dexamethasone.  

Continue with DVT prophylaxis.  Her blood pressure continued to be somewhat 

high.  I started her on metoprolol 25 mg.  She is already on losartan potassium 

100 mg once a day as her blood pressure is not optimally controlled.  We will 

increase her metoprolol to 50 mg twice a day.  I did speak with the patient and 

the  to see if she qualifies to go to the [facility] and 

apparently they have a long waiting list.







AMM/LAUREK

DR: AMM/nts   DD: 12/30/2021 14:46

DT: 12/30/2021 22:40   TID: [id number]

## 2021-12-31 VITALS — SYSTOLIC BLOOD PRESSURE: 182 MMHG | DIASTOLIC BLOOD PRESSURE: 80 MMHG

## 2021-12-31 VITALS — DIASTOLIC BLOOD PRESSURE: 76 MMHG | SYSTOLIC BLOOD PRESSURE: 165 MMHG

## 2021-12-31 VITALS — SYSTOLIC BLOOD PRESSURE: 128 MMHG | DIASTOLIC BLOOD PRESSURE: 78 MMHG

## 2021-12-31 VITALS — SYSTOLIC BLOOD PRESSURE: 145 MMHG | DIASTOLIC BLOOD PRESSURE: 71 MMHG

## 2021-12-31 VITALS — SYSTOLIC BLOOD PRESSURE: 169 MMHG | DIASTOLIC BLOOD PRESSURE: 72 MMHG

## 2021-12-31 VITALS — DIASTOLIC BLOOD PRESSURE: 82 MMHG | SYSTOLIC BLOOD PRESSURE: 169 MMHG

## 2021-12-31 VITALS — DIASTOLIC BLOOD PRESSURE: 78 MMHG | SYSTOLIC BLOOD PRESSURE: 169 MMHG

## 2021-12-31 VITALS — DIASTOLIC BLOOD PRESSURE: 63 MMHG | SYSTOLIC BLOOD PRESSURE: 151 MMHG

## 2021-12-31 VITALS — DIASTOLIC BLOOD PRESSURE: 86 MMHG | SYSTOLIC BLOOD PRESSURE: 181 MMHG

## 2021-12-31 VITALS — SYSTOLIC BLOOD PRESSURE: 159 MMHG | DIASTOLIC BLOOD PRESSURE: 75 MMHG

## 2021-12-31 VITALS — SYSTOLIC BLOOD PRESSURE: 190 MMHG | DIASTOLIC BLOOD PRESSURE: 92 MMHG

## 2021-12-31 VITALS — DIASTOLIC BLOOD PRESSURE: 72 MMHG | SYSTOLIC BLOOD PRESSURE: 161 MMHG

## 2021-12-31 VITALS — DIASTOLIC BLOOD PRESSURE: 68 MMHG | SYSTOLIC BLOOD PRESSURE: 147 MMHG

## 2021-12-31 VITALS — SYSTOLIC BLOOD PRESSURE: 161 MMHG | DIASTOLIC BLOOD PRESSURE: 72 MMHG

## 2021-12-31 VITALS — DIASTOLIC BLOOD PRESSURE: 74 MMHG | SYSTOLIC BLOOD PRESSURE: 144 MMHG

## 2021-12-31 VITALS — SYSTOLIC BLOOD PRESSURE: 160 MMHG | DIASTOLIC BLOOD PRESSURE: 69 MMHG

## 2021-12-31 VITALS — DIASTOLIC BLOOD PRESSURE: 87 MMHG | SYSTOLIC BLOOD PRESSURE: 180 MMHG

## 2021-12-31 VITALS — SYSTOLIC BLOOD PRESSURE: 171 MMHG | DIASTOLIC BLOOD PRESSURE: 76 MMHG

## 2021-12-31 VITALS — SYSTOLIC BLOOD PRESSURE: 126 MMHG | DIASTOLIC BLOOD PRESSURE: 104 MMHG

## 2021-12-31 VITALS — SYSTOLIC BLOOD PRESSURE: 161 MMHG | DIASTOLIC BLOOD PRESSURE: 89 MMHG

## 2021-12-31 VITALS — DIASTOLIC BLOOD PRESSURE: 81 MMHG | SYSTOLIC BLOOD PRESSURE: 154 MMHG

## 2021-12-31 VITALS — SYSTOLIC BLOOD PRESSURE: 153 MMHG | DIASTOLIC BLOOD PRESSURE: 76 MMHG

## 2021-12-31 VITALS — DIASTOLIC BLOOD PRESSURE: 80 MMHG | SYSTOLIC BLOOD PRESSURE: 180 MMHG

## 2021-12-31 LAB
ALBUMIN SERPL-MCNC: 2.5 G/DL (ref 3.4–5)
ALBUMIN/GLOB SERPL: 0.7 {RATIO} (ref 1–1.7)
ALP SERPL-CCNC: 275 U/L (ref 46–116)
ALT SERPL-CCNC: 178 U/L (ref 14–59)
ANION GAP SERPL CALC-SCNC: 9 MMOL/L (ref 6–14)
AST SERPL-CCNC: 55 U/L (ref 15–37)
BILIRUB SERPL-MCNC: 0.4 MG/DL (ref 0.2–1)
BUN/CREAT SERPL: 30 (ref 6–20)
CA-I SERPL ISE-MCNC: 27 MG/DL (ref 7–20)
CALCIUM SERPL-MCNC: 8.7 MG/DL (ref 8.5–10.1)
CHLORIDE SERPL-SCNC: 108 MMOL/L (ref 98–107)
CO2 SERPL-SCNC: 28 MMOL/L (ref 21–32)
CREAT SERPL-MCNC: 0.9 MG/DL (ref 0.6–1)
ERYTHROCYTE [DISTWIDTH] IN BLOOD BY AUTOMATED COUNT: 14.1 % (ref 11.5–14.5)
GFR SERPLBLD BASED ON 1.73 SQ M-ARVRAT: 61.2 ML/MIN
GLOBULIN SER-MCNC: 3.6 G/DL (ref 2.2–3.8)
GLUCOSE SERPL-MCNC: 136 MG/DL (ref 70–99)
HCT VFR BLD CALC: 35.6 % (ref 36–47)
HGB BLD-MCNC: 11.9 G/DL (ref 12–15.5)
MCH RBC QN AUTO: 31 PG (ref 25–35)
MCHC RBC AUTO-ENTMCNC: 34 G/DL (ref 31–37)
MCV RBC AUTO: 92 FL (ref 79–100)
PLATELET # BLD AUTO: 234 X10^3/UL (ref 140–400)
POTASSIUM SERPL-SCNC: 4.2 MMOL/L (ref 3.5–5.1)
PROT SERPL-MCNC: 6.1 G/DL (ref 6.4–8.2)
RBC # BLD AUTO: 3.88 X10^6/UL (ref 3.5–5.4)
SODIUM SERPL-SCNC: 145 MMOL/L (ref 136–145)
WBC # BLD AUTO: 14.3 X10^3/UL (ref 4–11)

## 2021-12-31 RX ADMIN — ENOXAPARIN SODIUM SCH MG: 100 INJECTION SUBCUTANEOUS at 16:25

## 2021-12-31 RX ADMIN — ZINC SULFATE CAP 220 MG (50 MG ELEMENTAL ZN) SCH MG: 220 (50 ZN) CAP at 09:05

## 2021-12-31 RX ADMIN — LOSARTAN POTASSIUM SCH MG: 50 TABLET, FILM COATED ORAL at 09:05

## 2021-12-31 RX ADMIN — INSULIN LISPRO SCH UNITS: 100 INJECTION, SOLUTION INTRAVENOUS; SUBCUTANEOUS at 08:00

## 2021-12-31 RX ADMIN — Medication SCH CAP: at 09:03

## 2021-12-31 RX ADMIN — AZITHROMYCIN SCH MG: 250 TABLET, FILM COATED ORAL at 09:06

## 2021-12-31 RX ADMIN — FAMOTIDINE SCH MG: 20 TABLET ORAL at 20:56

## 2021-12-31 RX ADMIN — Medication SCH MG: at 09:05

## 2021-12-31 RX ADMIN — Medication SCH CAP: at 20:56

## 2021-12-31 RX ADMIN — DEXAMETHASONE SODIUM PHOSPHATE SCH MG: 4 INJECTION, SOLUTION INTRAMUSCULAR; INTRAVENOUS at 09:05

## 2021-12-31 RX ADMIN — INSULIN LISPRO SCH UNITS: 100 INJECTION, SOLUTION INTRAVENOUS; SUBCUTANEOUS at 12:17

## 2021-12-31 RX ADMIN — BUDESONIDE PRN MG: 0.5 SUSPENSION RESPIRATORY (INHALATION) at 09:04

## 2021-12-31 RX ADMIN — METOPROLOL TARTRATE SCH MG: 50 TABLET, FILM COATED ORAL at 20:55

## 2021-12-31 RX ADMIN — METOPROLOL TARTRATE SCH MG: 50 TABLET, FILM COATED ORAL at 09:00

## 2021-12-31 RX ADMIN — INSULIN LISPRO SCH UNITS: 100 INJECTION, SOLUTION INTRAVENOUS; SUBCUTANEOUS at 17:15

## 2021-12-31 RX ADMIN — FAMOTIDINE SCH MG: 20 TABLET ORAL at 09:06

## 2021-12-31 NOTE — PN
DATE: 12/31/2021

SUBJECTIVE:  The patient is resting slightly propped up in bed, eating her lunch

comfortably, in no apparent distress.  She denied any chest pain or shortness of

breath at rest.  She is able to complete her sentence without difficulty, 

although, she continued to require 8 liters of oxygen to maintain her oxygen 

saturation above 90%.  She continued to desaturate with exertion.



PHYSICAL EXAMINATION:

GENERAL:  When I examined her, she was somewhat pale, not jaundiced, cyanosed, 

no lymphadenopathy, no thyromegaly, no jugular venous distention.  No lower limb

edema.

VITAL SIGNS:  Her heart rate was 66, blood pressure was 154/81, temperature was 

98.7, respiratory rate was 26 and oxygen saturation was 94% on 8 liters of 

oxygen.

HEAD, EYES, EARS, NOSE AND THROAT:  Normocephalic, atraumatic.

NECK:  Supple.

HEART:  Normal first and second heart sounds. No gallop or murmur.

CHEST:  Shows central trachea, equal bilateral expansion, air entry, vesicular 

breath sounds with bilateral early inspiratory coarse crackles.  I could not 

appreciate any rhonchi or wheezing.

ABDOMEN:  Slightly distended, soft, nontender.

NEUROLOGIC:  She was grossly intact.



Her intake was 1300, no output was recorded.



LABORATORY DATA:  This morning showed a white cell count is down to 14,300, 

hemoglobin 12, hematocrit 36, MCV 92 and platelet count of 234,000.  Her 

chemistry showed a serum sodium of 145, potassium 4.2, chloride 108, bicarbonate

28, anion gap of 9, BUN 27, creatinine 0.9.  Estimated GFR was 61 mL per minute.

 Her glucose 136, calcium was 8.7.  Total bilirubin is normal.  AST, ALT, 

alkaline phosphatase are elevated, but trending down.  Her total protein was 

6.8, albumin was 2.6.



ASSESSMENT:

1.  COVID-19 pneumonia.

2.  Acute hypoxic respiratory failure.

3.  Hypertension.

4.  The patient probably has underlying chronic interstitial disease as she has 

before pigeons and other types of birds that she was advised to get rid of.



PLAN:  My plan is to continue with steroids in the form of dexamethasone.  

Continue with DVT prophylaxis.  Her blood pressure was high, so I started her on

metoprolol 25 mg as she is already on losartan potassium 100 mg. In fact her 

blood pressure seems to be much better controlled now that she is on metoprolol 

50 mg twice a day and losartan 100 mg once a day.  We will continue with IV 

antibiotic.  Continue with DVT prophylaxis.  Continue with physical and 

occupational therapy.  Continue to monitor blood sugar and adjust insulin as 

needed.







AMM/TAB

DR: AMM/nts   DD: 12/31/2021 12:14

DT: 12/31/2021 20:06   TID: [id number]

## 2022-01-01 VITALS — SYSTOLIC BLOOD PRESSURE: 146 MMHG | DIASTOLIC BLOOD PRESSURE: 77 MMHG

## 2022-01-01 VITALS — DIASTOLIC BLOOD PRESSURE: 66 MMHG | SYSTOLIC BLOOD PRESSURE: 156 MMHG

## 2022-01-01 VITALS — SYSTOLIC BLOOD PRESSURE: 164 MMHG | DIASTOLIC BLOOD PRESSURE: 80 MMHG

## 2022-01-01 VITALS — DIASTOLIC BLOOD PRESSURE: 88 MMHG | SYSTOLIC BLOOD PRESSURE: 174 MMHG

## 2022-01-01 VITALS — DIASTOLIC BLOOD PRESSURE: 78 MMHG | SYSTOLIC BLOOD PRESSURE: 161 MMHG

## 2022-01-01 VITALS — DIASTOLIC BLOOD PRESSURE: 73 MMHG | SYSTOLIC BLOOD PRESSURE: 166 MMHG

## 2022-01-01 VITALS — SYSTOLIC BLOOD PRESSURE: 165 MMHG | DIASTOLIC BLOOD PRESSURE: 73 MMHG

## 2022-01-01 VITALS — SYSTOLIC BLOOD PRESSURE: 156 MMHG | DIASTOLIC BLOOD PRESSURE: 72 MMHG

## 2022-01-01 VITALS — DIASTOLIC BLOOD PRESSURE: 82 MMHG | SYSTOLIC BLOOD PRESSURE: 177 MMHG

## 2022-01-01 VITALS — SYSTOLIC BLOOD PRESSURE: 157 MMHG | DIASTOLIC BLOOD PRESSURE: 59 MMHG

## 2022-01-01 VITALS — SYSTOLIC BLOOD PRESSURE: 170 MMHG | DIASTOLIC BLOOD PRESSURE: 84 MMHG

## 2022-01-01 VITALS — DIASTOLIC BLOOD PRESSURE: 84 MMHG | SYSTOLIC BLOOD PRESSURE: 170 MMHG

## 2022-01-01 RX ADMIN — BUDESONIDE PRN MG: 0.5 SUSPENSION RESPIRATORY (INHALATION) at 09:24

## 2022-01-01 RX ADMIN — METOPROLOL TARTRATE SCH MG: 50 TABLET, FILM COATED ORAL at 09:00

## 2022-01-01 RX ADMIN — ZINC SULFATE CAP 220 MG (50 MG ELEMENTAL ZN) SCH MG: 220 (50 ZN) CAP at 09:24

## 2022-01-01 RX ADMIN — Medication SCH CAP: at 20:30

## 2022-01-01 RX ADMIN — FAMOTIDINE SCH MG: 20 TABLET ORAL at 20:30

## 2022-01-01 RX ADMIN — INSULIN LISPRO SCH UNITS: 100 INJECTION, SOLUTION INTRAVENOUS; SUBCUTANEOUS at 12:00

## 2022-01-01 RX ADMIN — ENOXAPARIN SODIUM SCH MG: 100 INJECTION SUBCUTANEOUS at 16:00

## 2022-01-01 RX ADMIN — INSULIN LISPRO SCH UNITS: 100 INJECTION, SOLUTION INTRAVENOUS; SUBCUTANEOUS at 08:00

## 2022-01-01 RX ADMIN — AZITHROMYCIN SCH MG: 250 TABLET, FILM COATED ORAL at 09:25

## 2022-01-01 RX ADMIN — Medication SCH CAP: at 09:25

## 2022-01-01 RX ADMIN — INSULIN LISPRO SCH UNITS: 100 INJECTION, SOLUTION INTRAVENOUS; SUBCUTANEOUS at 17:42

## 2022-01-01 RX ADMIN — DEXAMETHASONE SODIUM PHOSPHATE SCH MG: 4 INJECTION, SOLUTION INTRAMUSCULAR; INTRAVENOUS at 09:24

## 2022-01-01 RX ADMIN — LOSARTAN POTASSIUM SCH MG: 50 TABLET, FILM COATED ORAL at 09:25

## 2022-01-01 RX ADMIN — Medication SCH MG: at 09:24

## 2022-01-01 RX ADMIN — FAMOTIDINE SCH MG: 20 TABLET ORAL at 09:24

## 2022-01-01 NOTE — PN
DATE: 01/01/2022

SUBJECTIVE:  The patient is sitting up in bed, in no apparent respiratory 

distress.  She is now on 6 liters of oxygen, maintaining her oxygen saturation 

at 91-92%.  She continues to desaturate with exertion; however, she denied any 

chest pain, cough or phlegm.



PHYSICAL EXAMINATION:

GENERAL:  When I examined her, she was pale, but not jaundiced or cyanosed, no 

lymphadenopathy, no thyromegaly, no jugular venous distention.  No limb edema.

VITAL SIGNS:  Her heart rate was 48, blood pressure was 164/80, temperature was 

98, respiratory rate 22, and oxygen saturation was 92% on 6 liters of oxygen.

HEAD, EYES, EARS, NOSE, AND THROAT:  Normocephalic, atraumatic.

NECK:  Supple.

HEART:  Showed normal first and second heart sounds, no gallop or murmur.

CHEST:  Shows central trachea, equal bilateral chest expansion, air entry, 

vesicular breath sounds with early coarse inspiratory crackles both sides 

posteriorly.  I could not appreciate any rhonchi.

ABDOMEN:  Distended, soft, nontender.

NEUROLOGIC:  She is grossly intact.



Her intake was 1350, no output was recorded.  Her blood sugars seem to be 

reasonably controlled.



LABORATORY DATA:  As of yesterday showed hemoglobin 12, hematocrit 36 with a 

white cell count 14,300 and platelet 234,000.  Her chemistry showed that her BUN

is 27, creatinine 0.9 with normal electrolytes.  Continue to have transaminitis,

although her liver enzymes are trending down.



ASSESSMENT:

1.  COVID-19 pneumonia.

2.  Acute hypoxic respiratory failure.

3.  Hypertension.

4.  Probable underlying chronic interstitial lung disease, likely due to 

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

was advised to get rid of.



PLAN:  To continue with dexamethasone.  Continue with DVT prophylaxis.  Blood 

pressure was high; however, she is bradycardic and therefore probably have to 

discontinue the beta blockers and switch her to calcium channel blocker.







AMM/PEDRO PABLO

DR: AMM/nts   DD: 01/01/2022 13:09

DT: 01/01/2022 21:19   TID: [id number]

## 2022-01-02 VITALS — SYSTOLIC BLOOD PRESSURE: 155 MMHG | DIASTOLIC BLOOD PRESSURE: 65 MMHG

## 2022-01-02 VITALS — SYSTOLIC BLOOD PRESSURE: 179 MMHG | DIASTOLIC BLOOD PRESSURE: 83 MMHG

## 2022-01-02 VITALS — SYSTOLIC BLOOD PRESSURE: 185 MMHG | DIASTOLIC BLOOD PRESSURE: 82 MMHG

## 2022-01-02 VITALS — SYSTOLIC BLOOD PRESSURE: 166 MMHG | DIASTOLIC BLOOD PRESSURE: 85 MMHG

## 2022-01-02 VITALS — DIASTOLIC BLOOD PRESSURE: 65 MMHG | SYSTOLIC BLOOD PRESSURE: 148 MMHG

## 2022-01-02 VITALS — DIASTOLIC BLOOD PRESSURE: 79 MMHG | SYSTOLIC BLOOD PRESSURE: 165 MMHG

## 2022-01-02 VITALS — DIASTOLIC BLOOD PRESSURE: 70 MMHG | SYSTOLIC BLOOD PRESSURE: 143 MMHG

## 2022-01-02 VITALS — SYSTOLIC BLOOD PRESSURE: 161 MMHG | DIASTOLIC BLOOD PRESSURE: 73 MMHG

## 2022-01-02 VITALS — DIASTOLIC BLOOD PRESSURE: 81 MMHG | SYSTOLIC BLOOD PRESSURE: 161 MMHG

## 2022-01-02 VITALS — DIASTOLIC BLOOD PRESSURE: 78 MMHG | SYSTOLIC BLOOD PRESSURE: 175 MMHG

## 2022-01-02 VITALS — DIASTOLIC BLOOD PRESSURE: 89 MMHG | SYSTOLIC BLOOD PRESSURE: 185 MMHG

## 2022-01-02 VITALS — DIASTOLIC BLOOD PRESSURE: 73 MMHG | SYSTOLIC BLOOD PRESSURE: 170 MMHG

## 2022-01-02 VITALS — SYSTOLIC BLOOD PRESSURE: 145 MMHG | DIASTOLIC BLOOD PRESSURE: 75 MMHG

## 2022-01-02 VITALS — SYSTOLIC BLOOD PRESSURE: 143 MMHG | DIASTOLIC BLOOD PRESSURE: 74 MMHG

## 2022-01-02 LAB
ALBUMIN SERPL-MCNC: 2.5 G/DL (ref 3.4–5)
ALBUMIN/GLOB SERPL: 0.7 {RATIO} (ref 1–1.7)
ALP SERPL-CCNC: 248 U/L (ref 46–116)
ALT SERPL-CCNC: 198 U/L (ref 14–59)
ANION GAP SERPL CALC-SCNC: 7 MMOL/L (ref 6–14)
AST SERPL-CCNC: 45 U/L (ref 15–37)
BILIRUB SERPL-MCNC: 0.4 MG/DL (ref 0.2–1)
BUN/CREAT SERPL: 34 (ref 6–20)
CA-I SERPL ISE-MCNC: 31 MG/DL (ref 7–20)
CALCIUM SERPL-MCNC: 8.8 MG/DL (ref 8.5–10.1)
CHLORIDE SERPL-SCNC: 107 MMOL/L (ref 98–107)
CO2 SERPL-SCNC: 27 MMOL/L (ref 21–32)
CREAT SERPL-MCNC: 0.9 MG/DL (ref 0.6–1)
ERYTHROCYTE [DISTWIDTH] IN BLOOD BY AUTOMATED COUNT: 14.1 % (ref 11.5–14.5)
GFR SERPLBLD BASED ON 1.73 SQ M-ARVRAT: 61.2 ML/MIN
GLOBULIN SER-MCNC: 3.8 G/DL (ref 2.2–3.8)
GLUCOSE SERPL-MCNC: 138 MG/DL (ref 70–99)
HCT VFR BLD CALC: 36.5 % (ref 36–47)
HGB BLD-MCNC: 11.9 G/DL (ref 12–15.5)
MCH RBC QN AUTO: 30 PG (ref 25–35)
MCHC RBC AUTO-ENTMCNC: 33 G/DL (ref 31–37)
MCV RBC AUTO: 92 FL (ref 79–100)
PLATELET # BLD AUTO: 232 X10^3/UL (ref 140–400)
POTASSIUM SERPL-SCNC: 4.4 MMOL/L (ref 3.5–5.1)
PROT SERPL-MCNC: 6.3 G/DL (ref 6.4–8.2)
RBC # BLD AUTO: 3.97 X10^6/UL (ref 3.5–5.4)
SODIUM SERPL-SCNC: 141 MMOL/L (ref 136–145)
WBC # BLD AUTO: 16.7 X10^3/UL (ref 4–11)

## 2022-01-02 RX ADMIN — INSULIN LISPRO SCH UNITS: 100 INJECTION, SOLUTION INTRAVENOUS; SUBCUTANEOUS at 12:00

## 2022-01-02 RX ADMIN — FAMOTIDINE SCH MG: 20 TABLET ORAL at 21:31

## 2022-01-02 RX ADMIN — Medication SCH MG: at 09:04

## 2022-01-02 RX ADMIN — DEXAMETHASONE SODIUM PHOSPHATE SCH MG: 4 INJECTION, SOLUTION INTRAMUSCULAR; INTRAVENOUS at 09:01

## 2022-01-02 RX ADMIN — INSULIN LISPRO SCH UNITS: 100 INJECTION, SOLUTION INTRAVENOUS; SUBCUTANEOUS at 08:00

## 2022-01-02 RX ADMIN — INSULIN LISPRO SCH UNITS: 100 INJECTION, SOLUTION INTRAVENOUS; SUBCUTANEOUS at 17:00

## 2022-01-02 RX ADMIN — Medication SCH CAP: at 21:31

## 2022-01-02 RX ADMIN — AZITHROMYCIN SCH MG: 250 TABLET, FILM COATED ORAL at 09:04

## 2022-01-02 RX ADMIN — ZINC SULFATE CAP 220 MG (50 MG ELEMENTAL ZN) SCH MG: 220 (50 ZN) CAP at 09:03

## 2022-01-02 RX ADMIN — LOSARTAN POTASSIUM SCH MG: 50 TABLET, FILM COATED ORAL at 09:04

## 2022-01-02 RX ADMIN — FAMOTIDINE SCH MG: 20 TABLET ORAL at 09:02

## 2022-01-02 RX ADMIN — ENOXAPARIN SODIUM SCH MG: 100 INJECTION SUBCUTANEOUS at 17:30

## 2022-01-02 RX ADMIN — Medication SCH CAP: at 09:03

## 2022-01-02 NOTE — PN
DATE: 01/02/2022

SUBJECTIVE:  The patient is sitting up in bed, in no apparent distress.  She is 

now on 6 liters, maintaining her oxygen saturation at 93%. Questioning her, she 

denied any complaint.  The nursing staff did not voice any concerns. She had an 

uneventful night.



PHYSICAL EXAMINATION:

GENERAL:  When I examined her, she was pale, not jaundiced or cyanosed, no 

lymphadenopathy, no thyromegaly, no jugular venous distention.  No limb edema.

VITAL SIGNS:  Her heart rate was 76, blood pressure was 143/70, temperature was 

98.7, respiratory rate 20, and oxygen saturation was 93% on 6 liters of oxygen.

HEAD, EYES, EARS, NOSE AND THROAT:  Normocephalic, atraumatic.

NECK:  Supple.

HEART:  Showed normal first and second heart sounds. No gallop or murmur.

CHEST:  Shows central trachea, equal bilateral expansion, air entry, vesicular 

breath sounds with bilateral early coarse inspiratory crackles.  I could not 

appreciate any rhonchi.

ABDOMEN:  Distended, soft, nontender.

NEUROLOGIC:  She was grossly intact.



Her intake was 1200, no output was recorded.



LABORATORY DATA:  As of this morning, her white cell count was 16,700, 

hemoglobin 12, hematocrit 36, MCV 92 and platelet count 232,000.  Serum sodium 

was 141, potassium 4.4, chloride 107, bicarbonate 27, anion gap of 7, BUN 31, 

creatinine 0.9.  Estimated GFR was 61 mL per minute.  Her glucose 138, calcium 

was 8.8.  Total bilirubin is normal.  AST, ALT continue to be elevated.  Total 

protein 6.3, albumin was 2.5.



ASSESSMENT:

1.  COVID-19 pneumonia.

2.  Acute hypoxic respiratory failure.

3.  Hypertension, much better controlled.  We added the amlodipine and 

discontinued the metoprolol.

4.  Probable underlying chronic interstitial disease, likely due to exposure to 

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



PLAN:  To continue with IV dexamethasone.  Continue with DVT prophylaxis.  Her 

bradycardia has resolved after we discontinued the metoprolol and she is now on 

amlodipine.







AMM/JAGDEEP

DR: AMM/nts   DD: 01/02/2022 11:56

DT: 01/02/2022 15:00   TID: [id number]

## 2022-01-03 VITALS — DIASTOLIC BLOOD PRESSURE: 95 MMHG | SYSTOLIC BLOOD PRESSURE: 184 MMHG

## 2022-01-03 VITALS — SYSTOLIC BLOOD PRESSURE: 170 MMHG | DIASTOLIC BLOOD PRESSURE: 80 MMHG

## 2022-01-03 VITALS — SYSTOLIC BLOOD PRESSURE: 162 MMHG | DIASTOLIC BLOOD PRESSURE: 73 MMHG

## 2022-01-03 VITALS — DIASTOLIC BLOOD PRESSURE: 70 MMHG | SYSTOLIC BLOOD PRESSURE: 153 MMHG

## 2022-01-03 VITALS — DIASTOLIC BLOOD PRESSURE: 67 MMHG | SYSTOLIC BLOOD PRESSURE: 151 MMHG

## 2022-01-03 VITALS — DIASTOLIC BLOOD PRESSURE: 88 MMHG | SYSTOLIC BLOOD PRESSURE: 177 MMHG

## 2022-01-03 VITALS — SYSTOLIC BLOOD PRESSURE: 185 MMHG | DIASTOLIC BLOOD PRESSURE: 81 MMHG

## 2022-01-03 VITALS — SYSTOLIC BLOOD PRESSURE: 174 MMHG | DIASTOLIC BLOOD PRESSURE: 71 MMHG

## 2022-01-03 VITALS — DIASTOLIC BLOOD PRESSURE: 77 MMHG | SYSTOLIC BLOOD PRESSURE: 175 MMHG

## 2022-01-03 VITALS — SYSTOLIC BLOOD PRESSURE: 171 MMHG | DIASTOLIC BLOOD PRESSURE: 90 MMHG

## 2022-01-03 VITALS — SYSTOLIC BLOOD PRESSURE: 185 MMHG | DIASTOLIC BLOOD PRESSURE: 92 MMHG

## 2022-01-03 VITALS — DIASTOLIC BLOOD PRESSURE: 84 MMHG | SYSTOLIC BLOOD PRESSURE: 182 MMHG

## 2022-01-03 VITALS — SYSTOLIC BLOOD PRESSURE: 165 MMHG | DIASTOLIC BLOOD PRESSURE: 68 MMHG

## 2022-01-03 VITALS — DIASTOLIC BLOOD PRESSURE: 80 MMHG | SYSTOLIC BLOOD PRESSURE: 159 MMHG

## 2022-01-03 VITALS — SYSTOLIC BLOOD PRESSURE: 145 MMHG | DIASTOLIC BLOOD PRESSURE: 71 MMHG

## 2022-01-03 VITALS — SYSTOLIC BLOOD PRESSURE: 168 MMHG | DIASTOLIC BLOOD PRESSURE: 99 MMHG

## 2022-01-03 VITALS — SYSTOLIC BLOOD PRESSURE: 178 MMHG | DIASTOLIC BLOOD PRESSURE: 74 MMHG

## 2022-01-03 VITALS — DIASTOLIC BLOOD PRESSURE: 68 MMHG | SYSTOLIC BLOOD PRESSURE: 145 MMHG

## 2022-01-03 VITALS — SYSTOLIC BLOOD PRESSURE: 153 MMHG | DIASTOLIC BLOOD PRESSURE: 70 MMHG

## 2022-01-03 VITALS — DIASTOLIC BLOOD PRESSURE: 80 MMHG | SYSTOLIC BLOOD PRESSURE: 173 MMHG

## 2022-01-03 VITALS — SYSTOLIC BLOOD PRESSURE: 180 MMHG | DIASTOLIC BLOOD PRESSURE: 86 MMHG

## 2022-01-03 VITALS — DIASTOLIC BLOOD PRESSURE: 77 MMHG | SYSTOLIC BLOOD PRESSURE: 163 MMHG

## 2022-01-03 VITALS — SYSTOLIC BLOOD PRESSURE: 156 MMHG | DIASTOLIC BLOOD PRESSURE: 73 MMHG

## 2022-01-03 VITALS — DIASTOLIC BLOOD PRESSURE: 69 MMHG | SYSTOLIC BLOOD PRESSURE: 169 MMHG

## 2022-01-03 LAB
ALBUMIN SERPL-MCNC: 2.6 G/DL (ref 3.4–5)
ALBUMIN/GLOB SERPL: 0.8 {RATIO} (ref 1–1.7)
ALP SERPL-CCNC: 255 U/L (ref 46–116)
ALT SERPL-CCNC: 262 U/L (ref 14–59)
ANION GAP SERPL CALC-SCNC: 7 MMOL/L (ref 6–14)
AST SERPL-CCNC: 59 U/L (ref 15–37)
BASOPHILS # BLD AUTO: 0 X10^3/UL (ref 0–0.2)
BASOPHILS NFR BLD: 0 % (ref 0–3)
BILIRUB SERPL-MCNC: 0.4 MG/DL (ref 0.2–1)
BUN/CREAT SERPL: 28 (ref 6–20)
CA-I SERPL ISE-MCNC: 25 MG/DL (ref 7–20)
CALCIUM SERPL-MCNC: 8.6 MG/DL (ref 8.5–10.1)
CHLORIDE SERPL-SCNC: 108 MMOL/L (ref 98–107)
CO2 SERPL-SCNC: 28 MMOL/L (ref 21–32)
CREAT SERPL-MCNC: 0.9 MG/DL (ref 0.6–1)
EOSINOPHIL NFR BLD: 0 % (ref 0–3)
EOSINOPHIL NFR BLD: 0 X10^3/UL (ref 0–0.7)
ERYTHROCYTE [DISTWIDTH] IN BLOOD BY AUTOMATED COUNT: 14.4 % (ref 11.5–14.5)
GFR SERPLBLD BASED ON 1.73 SQ M-ARVRAT: 61.2 ML/MIN
GLOBULIN SER-MCNC: 3.1 G/DL (ref 2.2–3.8)
GLUCOSE SERPL-MCNC: 121 MG/DL (ref 70–99)
HCT VFR BLD CALC: 36.6 % (ref 36–47)
HGB BLD-MCNC: 12.1 G/DL (ref 12–15.5)
LYMPHOCYTES # BLD: 2.3 X10^3/UL (ref 1–4.8)
LYMPHOCYTES NFR BLD AUTO: 14 % (ref 24–48)
MCH RBC QN AUTO: 30 PG (ref 25–35)
MCHC RBC AUTO-ENTMCNC: 33 G/DL (ref 31–37)
MCV RBC AUTO: 92 FL (ref 79–100)
MONO #: 1.2 X10^3/UL (ref 0–1.1)
MONOCYTES NFR BLD: 7 % (ref 0–9)
NEUT #: 13 X10^3UL (ref 1.8–7.7)
NEUTROPHILS NFR BLD AUTO: 79 % (ref 31–73)
PLATELET # BLD AUTO: 228 X10^3/UL (ref 140–400)
POTASSIUM SERPL-SCNC: 4.3 MMOL/L (ref 3.5–5.1)
PROT SERPL-MCNC: 5.7 G/DL (ref 6.4–8.2)
RBC # BLD AUTO: 3.99 X10^6/UL (ref 3.5–5.4)
SODIUM SERPL-SCNC: 143 MMOL/L (ref 136–145)
WBC # BLD AUTO: 16.5 X10^3/UL (ref 4–11)

## 2022-01-03 RX ADMIN — LOSARTAN POTASSIUM SCH MG: 50 TABLET, FILM COATED ORAL at 07:57

## 2022-01-03 RX ADMIN — Medication SCH MG: at 07:57

## 2022-01-03 RX ADMIN — INSULIN LISPRO SCH UNITS: 100 INJECTION, SOLUTION INTRAVENOUS; SUBCUTANEOUS at 17:27

## 2022-01-03 RX ADMIN — ZINC SULFATE CAP 220 MG (50 MG ELEMENTAL ZN) SCH MG: 220 (50 ZN) CAP at 07:58

## 2022-01-03 RX ADMIN — FAMOTIDINE SCH MG: 20 TABLET ORAL at 07:58

## 2022-01-03 RX ADMIN — BUDESONIDE PRN MG: 0.5 SUSPENSION RESPIRATORY (INHALATION) at 20:12

## 2022-01-03 RX ADMIN — DEXAMETHASONE SODIUM PHOSPHATE SCH MG: 4 INJECTION, SOLUTION INTRAMUSCULAR; INTRAVENOUS at 07:58

## 2022-01-03 RX ADMIN — INSULIN LISPRO SCH UNITS: 100 INJECTION, SOLUTION INTRAVENOUS; SUBCUTANEOUS at 13:03

## 2022-01-03 RX ADMIN — BUDESONIDE PRN MG: 0.5 SUSPENSION RESPIRATORY (INHALATION) at 07:57

## 2022-01-03 RX ADMIN — INSULIN LISPRO SCH UNITS: 100 INJECTION, SOLUTION INTRAVENOUS; SUBCUTANEOUS at 08:00

## 2022-01-03 RX ADMIN — CHOLECALCIFEROL CAP 1.25 MG (50000 UNIT) SCH UNIT: 1.25 CAP at 07:59

## 2022-01-03 RX ADMIN — Medication SCH CAP: at 07:57

## 2022-01-03 RX ADMIN — Medication SCH CAP: at 20:12

## 2022-01-03 RX ADMIN — ENOXAPARIN SODIUM SCH MG: 100 INJECTION SUBCUTANEOUS at 16:45

## 2022-01-03 RX ADMIN — AZITHROMYCIN SCH MG: 250 TABLET, FILM COATED ORAL at 07:57

## 2022-01-03 RX ADMIN — FAMOTIDINE SCH MG: 20 TABLET ORAL at 20:12

## 2022-01-03 NOTE — PN
DATE: 01/03/2022

SUBJECTIVE:  The patient is resting, slightly propped up in bed, in no apparent 

distress.  On questioning her, denied any complaint.  Nursing staff did not 

voice any concern.  She is now down to 6 liters, maintaining her oxygen 

saturation at 91-92%.  She continued to desaturate with exertion.



PHYSICAL EXAMINATION:

GENERAL:  On examining her, she was pale, but not jaundiced or cyanosed. No 

lymphadenopathy, no thyromegaly, no jugular venous distention.  No limb edema.

VITAL SIGNS:  Her heart rate was 68, blood pressure was 145/68, temperature was 

98.5, respiratory rate 20, and oxygen saturation was 91% on 6 liters of oxygen 

by nasal cannula.

HEAD, EYES, EARS, NOSE, AND THROAT:  Normocephalic, atraumatic.

NECK:  Supple.

HEART:  Normal first and second heart sounds, no gallop or murmur.

CHEST:  Shows central trachea.  The patient has equal bilateral chest expansion,

air entry with early inspiratory coarse crackles bilaterally posteriorly.  I 

could not appreciate any wheezing or rhonchi.

ABDOMEN:  Soft, nontender.

NEUROLOGIC:  She was grossly intact.



Her intake was 1560, no output was recorded.



LABORATORY DATA:  As of this morning, her white cell count was 16,500, 

hemoglobin 12, hematocrit 36, MCV 92 and platelet count 228,000.  Her serum 

sodium was 143, potassium 4.3, chloride 108, bicarbonate 28, anion gap of 7, BUN

25, creatinine 0.9.  Estimated GFR was 61 mL per minute.  Her glucose 121, 

calcium was 8.6.  Total bilirubin is normal.  AST, ALT and alkaline phosphatase 

are elevated.  Total protein was 5.7, albumin was 2.6.



ASSESSMENT:

1.  COVID-19 pneumonia.

2.  Acute hypoxic respiratory failure.

3.  Hypertension, much better controlled that we did add amlodipine and 

discontinued her metoprolol.

4.  Probable underlying chronic interstitial lung disease, likely due to birds 

as she used to have pigeons that she was advised to get rid of and most recently

also has other kinds of birds that she was advised to get rid of.



PLAN:  My plan is to obviously continue with dexamethasone.  Continue with DVT 

prophylaxis.  Her bradycardia has resolved after we discontinued her metoprolol 

and she is now on amlodipine. If her oxygen requirement dropped 5 or below on 

exertion, the patient probably can go home and oxygen; however, so far, she 

continued to be borderline, so we will evaluate her on a daily basis.  She 

continued to require higher oxygen flow and was accepted at [facility].  

We will discharge her there.







LAVERNE

DR: AMM/nts   DD: 01/03/2022 11:34

DT: 01/03/2022 12:31   TID: [id number]

## 2022-01-04 VITALS — DIASTOLIC BLOOD PRESSURE: 78 MMHG | SYSTOLIC BLOOD PRESSURE: 170 MMHG

## 2022-01-04 VITALS — SYSTOLIC BLOOD PRESSURE: 131 MMHG | DIASTOLIC BLOOD PRESSURE: 58 MMHG

## 2022-01-04 VITALS — DIASTOLIC BLOOD PRESSURE: 70 MMHG | SYSTOLIC BLOOD PRESSURE: 153 MMHG

## 2022-01-04 VITALS — DIASTOLIC BLOOD PRESSURE: 68 MMHG | SYSTOLIC BLOOD PRESSURE: 142 MMHG

## 2022-01-04 VITALS — SYSTOLIC BLOOD PRESSURE: 183 MMHG | DIASTOLIC BLOOD PRESSURE: 74 MMHG

## 2022-01-04 VITALS — SYSTOLIC BLOOD PRESSURE: 170 MMHG | DIASTOLIC BLOOD PRESSURE: 77 MMHG

## 2022-01-04 VITALS — DIASTOLIC BLOOD PRESSURE: 82 MMHG | SYSTOLIC BLOOD PRESSURE: 149 MMHG

## 2022-01-04 VITALS — SYSTOLIC BLOOD PRESSURE: 175 MMHG | DIASTOLIC BLOOD PRESSURE: 67 MMHG

## 2022-01-04 VITALS — DIASTOLIC BLOOD PRESSURE: 72 MMHG | SYSTOLIC BLOOD PRESSURE: 177 MMHG

## 2022-01-04 VITALS — SYSTOLIC BLOOD PRESSURE: 171 MMHG | DIASTOLIC BLOOD PRESSURE: 78 MMHG

## 2022-01-04 VITALS — SYSTOLIC BLOOD PRESSURE: 134 MMHG | DIASTOLIC BLOOD PRESSURE: 73 MMHG

## 2022-01-04 VITALS — DIASTOLIC BLOOD PRESSURE: 70 MMHG | SYSTOLIC BLOOD PRESSURE: 135 MMHG

## 2022-01-04 VITALS — SYSTOLIC BLOOD PRESSURE: 153 MMHG | DIASTOLIC BLOOD PRESSURE: 71 MMHG

## 2022-01-04 VITALS — DIASTOLIC BLOOD PRESSURE: 70 MMHG | SYSTOLIC BLOOD PRESSURE: 140 MMHG

## 2022-01-04 VITALS — DIASTOLIC BLOOD PRESSURE: 86 MMHG | SYSTOLIC BLOOD PRESSURE: 159 MMHG

## 2022-01-04 VITALS — DIASTOLIC BLOOD PRESSURE: 62 MMHG | SYSTOLIC BLOOD PRESSURE: 152 MMHG

## 2022-01-04 VITALS — SYSTOLIC BLOOD PRESSURE: 162 MMHG | DIASTOLIC BLOOD PRESSURE: 75 MMHG

## 2022-01-04 VITALS — SYSTOLIC BLOOD PRESSURE: 161 MMHG | DIASTOLIC BLOOD PRESSURE: 87 MMHG

## 2022-01-04 VITALS — SYSTOLIC BLOOD PRESSURE: 135 MMHG | DIASTOLIC BLOOD PRESSURE: 66 MMHG

## 2022-01-04 VITALS — SYSTOLIC BLOOD PRESSURE: 152 MMHG | DIASTOLIC BLOOD PRESSURE: 79 MMHG

## 2022-01-04 VITALS — SYSTOLIC BLOOD PRESSURE: 148 MMHG | DIASTOLIC BLOOD PRESSURE: 68 MMHG

## 2022-01-04 VITALS — SYSTOLIC BLOOD PRESSURE: 154 MMHG | DIASTOLIC BLOOD PRESSURE: 76 MMHG

## 2022-01-04 VITALS — SYSTOLIC BLOOD PRESSURE: 168 MMHG | DIASTOLIC BLOOD PRESSURE: 75 MMHG

## 2022-01-04 LAB
ALBUMIN SERPL-MCNC: 2.6 G/DL (ref 3.4–5)
ALBUMIN/GLOB SERPL: 0.9 {RATIO} (ref 1–1.7)
ALP SERPL-CCNC: 245 U/L (ref 46–116)
ALT SERPL-CCNC: 285 U/L (ref 14–59)
ANION GAP SERPL CALC-SCNC: 8 MMOL/L (ref 6–14)
AST SERPL-CCNC: 62 U/L (ref 15–37)
BASOPHILS # BLD AUTO: 0 X10^3/UL (ref 0–0.2)
BASOPHILS NFR BLD: 0 % (ref 0–3)
BILIRUB SERPL-MCNC: 0.4 MG/DL (ref 0.2–1)
BUN/CREAT SERPL: 33 (ref 6–20)
CA-I SERPL ISE-MCNC: 30 MG/DL (ref 7–20)
CALCIUM SERPL-MCNC: 8.5 MG/DL (ref 8.5–10.1)
CHLORIDE SERPL-SCNC: 107 MMOL/L (ref 98–107)
CO2 SERPL-SCNC: 26 MMOL/L (ref 21–32)
CREAT SERPL-MCNC: 0.9 MG/DL (ref 0.6–1)
EOSINOPHIL NFR BLD: 0 % (ref 0–3)
EOSINOPHIL NFR BLD: 0 X10^3/UL (ref 0–0.7)
ERYTHROCYTE [DISTWIDTH] IN BLOOD BY AUTOMATED COUNT: 14.2 % (ref 11.5–14.5)
GFR SERPLBLD BASED ON 1.73 SQ M-ARVRAT: 61.2 ML/MIN
GLOBULIN SER-MCNC: 3 G/DL (ref 2.2–3.8)
GLUCOSE SERPL-MCNC: 160 MG/DL (ref 70–99)
HCT VFR BLD CALC: 35.2 % (ref 36–47)
HGB BLD-MCNC: 11.8 G/DL (ref 12–15.5)
LYMPHOCYTES # BLD: 2.1 X10^3/UL (ref 1–4.8)
LYMPHOCYTES NFR BLD AUTO: 13 % (ref 24–48)
MCH RBC QN AUTO: 31 PG (ref 25–35)
MCHC RBC AUTO-ENTMCNC: 34 G/DL (ref 31–37)
MCV RBC AUTO: 91 FL (ref 79–100)
MONO #: 1.2 X10^3/UL (ref 0–1.1)
MONOCYTES NFR BLD: 7 % (ref 0–9)
NEUT #: 13.5 X10^3UL (ref 1.8–7.7)
NEUTROPHILS NFR BLD AUTO: 80 % (ref 31–73)
PLATELET # BLD AUTO: 220 X10^3/UL (ref 140–400)
POTASSIUM SERPL-SCNC: 4.3 MMOL/L (ref 3.5–5.1)
PROT SERPL-MCNC: 5.6 G/DL (ref 6.4–8.2)
RBC # BLD AUTO: 3.86 X10^6/UL (ref 3.5–5.4)
SODIUM SERPL-SCNC: 141 MMOL/L (ref 136–145)
WBC # BLD AUTO: 16.9 X10^3/UL (ref 4–11)

## 2022-01-04 RX ADMIN — BUDESONIDE PRN MG: 0.5 SUSPENSION RESPIRATORY (INHALATION) at 08:34

## 2022-01-04 RX ADMIN — DEXAMETHASONE SODIUM PHOSPHATE SCH MG: 4 INJECTION, SOLUTION INTRAMUSCULAR; INTRAVENOUS at 08:34

## 2022-01-04 RX ADMIN — FAMOTIDINE SCH MG: 20 TABLET ORAL at 08:34

## 2022-01-04 RX ADMIN — INSULIN LISPRO SCH UNITS: 100 INJECTION, SOLUTION INTRAVENOUS; SUBCUTANEOUS at 12:09

## 2022-01-04 RX ADMIN — ENOXAPARIN SODIUM SCH MG: 100 INJECTION SUBCUTANEOUS at 17:18

## 2022-01-04 RX ADMIN — INSULIN LISPRO SCH UNITS: 100 INJECTION, SOLUTION INTRAVENOUS; SUBCUTANEOUS at 08:00

## 2022-01-04 RX ADMIN — LOSARTAN POTASSIUM SCH MG: 25 TABLET, FILM COATED ORAL at 08:35

## 2022-01-04 RX ADMIN — Medication SCH CAP: at 20:17

## 2022-01-04 RX ADMIN — ZINC SULFATE CAP 220 MG (50 MG ELEMENTAL ZN) SCH MG: 220 (50 ZN) CAP at 08:35

## 2022-01-04 RX ADMIN — FAMOTIDINE SCH MG: 20 TABLET ORAL at 20:17

## 2022-01-04 RX ADMIN — Medication SCH MG: at 08:35

## 2022-01-04 RX ADMIN — INSULIN LISPRO SCH UNITS: 100 INJECTION, SOLUTION INTRAVENOUS; SUBCUTANEOUS at 17:18

## 2022-01-04 RX ADMIN — BUDESONIDE PRN MG: 0.5 SUSPENSION RESPIRATORY (INHALATION) at 20:17

## 2022-01-04 RX ADMIN — Medication SCH CAP: at 08:35

## 2022-01-04 NOTE — PN
DATE: 01/04/2022

SUBJECTIVE:  The patient is sitting up in her bed, in no apparent distress.  She

continues to be on 6 liters of oxygen at rest, maintaining her oxygen saturation

at 93%; however, she continues to desaturate dramatically with any exertion.  

She is able to finish her sentence.  She is not using any accessory muscles.  

Denied any shortness of breath, cough or phlegm.



PHYSICAL EXAMINATION:

GENERAL:  When I examined her, she was somewhat pale, not jaundiced or cyanosed,

no lymphadenopathy, no thyromegaly, no jugular venous distention.  No limb 

edema.

VITAL SIGNS:  Her heart rate was 67, blood pressure was 153/71, temperature was 

97.7, respiratory rate was 25 and oxygen saturation was 93% on 6 liters of 

oxygen.

HEAD, EYES, EARS, NOSE, AND THROAT:  Normocephalic, atraumatic.

NECK:  Supple.

HEART:  Showed normal first and second heart sounds.  No gallop, rub or murmur.

CHEST:  Shows central trachea, equal bilateral chest expansion, air entry, 

vesicular breath sounds with bilateral early inspiratory coarse crackles on both

sides.  I could not appreciate any rhonchi.

ABDOMEN:  Distended, soft, nontender.

NEUROLOGIC:  She was grossly intact.



Her intake over the last 24 hours was 960, no output was recorded.



LABORATORY DATA:  As of this morning, her white cell count was 16,900, 

hemoglobin 12, hematocrit 35, MCV 91, and platelet count 220,000.  Her serum 

sodium was 141, potassium 4.3, chloride 107, bicarbonate 26, anion gap of 8, BUN

30, creatinine 0.9.  Estimated GFR was 61 mL per minute.  Her glucose 160, 

calcium was 8.5.  Total bilirubin normal.  AST, ALT, alkaline phosphatase are 

elevated, but they are generally trending down.  Her total protein was 5.6, 

albumin was 2.6.



ASSESSMENT:

1.  COVID-19 pneumonia.

2.  Acute hypoxic respiratory failure.

3.  Hypertension, much better controlled after we added amlodipine and 

discontinued the metoprolol.

4.  Probable underlying chronic interstitial lung disease, likely due to birds 

exposure.



PLAN:  My plan is to continue with dexamethasone.  Continue with DVT 

prophylaxis.  Her bradycardia has resolved after we discontinued her metoprolol.

 She is now on amlodipine.  Her requirement of oxygen is down to 6 liters at 

rest, but unfortunately with any exertion she desaturates dramatically and she 

continues to be a candidate to be transferred to [facility] if a 

bed becomes available for her.







AMM/PEDRO PABLO

DR: AMM/nts   DD: 01/04/2022 12:51

DT: 01/04/2022 20:22   TID: [id number]

## 2022-01-05 VITALS — DIASTOLIC BLOOD PRESSURE: 85 MMHG | SYSTOLIC BLOOD PRESSURE: 162 MMHG

## 2022-01-05 VITALS — SYSTOLIC BLOOD PRESSURE: 149 MMHG | DIASTOLIC BLOOD PRESSURE: 75 MMHG

## 2022-01-05 VITALS — DIASTOLIC BLOOD PRESSURE: 73 MMHG | SYSTOLIC BLOOD PRESSURE: 150 MMHG

## 2022-01-05 VITALS — SYSTOLIC BLOOD PRESSURE: 153 MMHG | DIASTOLIC BLOOD PRESSURE: 63 MMHG

## 2022-01-05 VITALS — SYSTOLIC BLOOD PRESSURE: 162 MMHG | DIASTOLIC BLOOD PRESSURE: 77 MMHG

## 2022-01-05 VITALS — SYSTOLIC BLOOD PRESSURE: 141 MMHG | DIASTOLIC BLOOD PRESSURE: 59 MMHG

## 2022-01-05 VITALS — DIASTOLIC BLOOD PRESSURE: 75 MMHG | SYSTOLIC BLOOD PRESSURE: 166 MMHG

## 2022-01-05 VITALS — DIASTOLIC BLOOD PRESSURE: 75 MMHG | SYSTOLIC BLOOD PRESSURE: 149 MMHG

## 2022-01-05 VITALS — SYSTOLIC BLOOD PRESSURE: 158 MMHG | DIASTOLIC BLOOD PRESSURE: 74 MMHG

## 2022-01-05 VITALS — SYSTOLIC BLOOD PRESSURE: 159 MMHG | DIASTOLIC BLOOD PRESSURE: 77 MMHG

## 2022-01-05 VITALS — SYSTOLIC BLOOD PRESSURE: 163 MMHG | DIASTOLIC BLOOD PRESSURE: 80 MMHG

## 2022-01-05 VITALS — DIASTOLIC BLOOD PRESSURE: 70 MMHG | SYSTOLIC BLOOD PRESSURE: 158 MMHG

## 2022-01-05 VITALS — SYSTOLIC BLOOD PRESSURE: 157 MMHG | DIASTOLIC BLOOD PRESSURE: 72 MMHG

## 2022-01-05 VITALS — SYSTOLIC BLOOD PRESSURE: 165 MMHG | DIASTOLIC BLOOD PRESSURE: 73 MMHG

## 2022-01-05 VITALS — DIASTOLIC BLOOD PRESSURE: 78 MMHG | SYSTOLIC BLOOD PRESSURE: 173 MMHG

## 2022-01-05 VITALS — SYSTOLIC BLOOD PRESSURE: 159 MMHG | DIASTOLIC BLOOD PRESSURE: 74 MMHG

## 2022-01-05 VITALS — DIASTOLIC BLOOD PRESSURE: 66 MMHG | SYSTOLIC BLOOD PRESSURE: 144 MMHG

## 2022-01-05 VITALS — SYSTOLIC BLOOD PRESSURE: 157 MMHG | DIASTOLIC BLOOD PRESSURE: 85 MMHG

## 2022-01-05 LAB
ALBUMIN SERPL-MCNC: 2.6 G/DL (ref 3.4–5)
ALBUMIN/GLOB SERPL: 0.8 {RATIO} (ref 1–1.7)
ALP SERPL-CCNC: 257 U/L (ref 46–116)
ALT SERPL-CCNC: 430 U/L (ref 14–59)
ANION GAP SERPL CALC-SCNC: 7 MMOL/L (ref 6–14)
AST SERPL-CCNC: 101 U/L (ref 15–37)
BASOPHILS # BLD AUTO: 0 X10^3/UL (ref 0–0.2)
BASOPHILS NFR BLD: 0 % (ref 0–3)
BILIRUB SERPL-MCNC: 0.4 MG/DL (ref 0.2–1)
BUN/CREAT SERPL: 36 (ref 6–20)
CA-I SERPL ISE-MCNC: 32 MG/DL (ref 7–20)
CALCIUM SERPL-MCNC: 8.6 MG/DL (ref 8.5–10.1)
CHLORIDE SERPL-SCNC: 107 MMOL/L (ref 98–107)
CO2 SERPL-SCNC: 28 MMOL/L (ref 21–32)
CREAT SERPL-MCNC: 0.9 MG/DL (ref 0.6–1)
EOSINOPHIL NFR BLD: 0 % (ref 0–3)
EOSINOPHIL NFR BLD: 0 X10^3/UL (ref 0–0.7)
ERYTHROCYTE [DISTWIDTH] IN BLOOD BY AUTOMATED COUNT: 14.7 % (ref 11.5–14.5)
GFR SERPLBLD BASED ON 1.73 SQ M-ARVRAT: 61.2 ML/MIN
GLOBULIN SER-MCNC: 3.1 G/DL (ref 2.2–3.8)
GLUCOSE SERPL-MCNC: 144 MG/DL (ref 70–99)
HCT VFR BLD CALC: 36.7 % (ref 36–47)
HGB BLD-MCNC: 12 G/DL (ref 12–15.5)
LYMPHOCYTES # BLD: 2.1 X10^3/UL (ref 1–4.8)
LYMPHOCYTES NFR BLD AUTO: 12 % (ref 24–48)
MCH RBC QN AUTO: 30 PG (ref 25–35)
MCHC RBC AUTO-ENTMCNC: 33 G/DL (ref 31–37)
MCV RBC AUTO: 92 FL (ref 79–100)
MONO #: 1.2 X10^3/UL (ref 0–1.1)
MONOCYTES NFR BLD: 7 % (ref 0–9)
NEUT #: 13.9 X10^3UL (ref 1.8–7.7)
NEUTROPHILS NFR BLD AUTO: 81 % (ref 31–73)
PLATELET # BLD AUTO: 227 X10^3/UL (ref 140–400)
POTASSIUM SERPL-SCNC: 4.5 MMOL/L (ref 3.5–5.1)
PROT SERPL-MCNC: 5.7 G/DL (ref 6.4–8.2)
RBC # BLD AUTO: 4 X10^6/UL (ref 3.5–5.4)
SODIUM SERPL-SCNC: 142 MMOL/L (ref 136–145)
WBC # BLD AUTO: 17.3 X10^3/UL (ref 4–11)

## 2022-01-05 RX ADMIN — INSULIN LISPRO SCH UNITS: 100 INJECTION, SOLUTION INTRAVENOUS; SUBCUTANEOUS at 08:00

## 2022-01-05 RX ADMIN — BUDESONIDE PRN MG: 0.5 SUSPENSION RESPIRATORY (INHALATION) at 20:34

## 2022-01-05 RX ADMIN — LOSARTAN POTASSIUM SCH MG: 25 TABLET, FILM COATED ORAL at 09:55

## 2022-01-05 RX ADMIN — Medication SCH MG: at 09:56

## 2022-01-05 RX ADMIN — ZINC SULFATE CAP 220 MG (50 MG ELEMENTAL ZN) SCH MG: 220 (50 ZN) CAP at 09:55

## 2022-01-05 RX ADMIN — Medication SCH CAP: at 20:35

## 2022-01-05 RX ADMIN — INSULIN LISPRO SCH UNITS: 100 INJECTION, SOLUTION INTRAVENOUS; SUBCUTANEOUS at 17:38

## 2022-01-05 RX ADMIN — FAMOTIDINE SCH MG: 20 TABLET ORAL at 09:56

## 2022-01-05 RX ADMIN — Medication SCH CAP: at 09:55

## 2022-01-05 RX ADMIN — DEXAMETHASONE SODIUM PHOSPHATE SCH MG: 4 INJECTION, SOLUTION INTRAMUSCULAR; INTRAVENOUS at 09:56

## 2022-01-05 RX ADMIN — BUDESONIDE PRN MG: 0.5 SUSPENSION RESPIRATORY (INHALATION) at 09:56

## 2022-01-05 RX ADMIN — FAMOTIDINE SCH MG: 20 TABLET ORAL at 20:35

## 2022-01-05 RX ADMIN — INSULIN LISPRO SCH UNITS: 100 INJECTION, SOLUTION INTRAVENOUS; SUBCUTANEOUS at 12:00

## 2022-01-05 RX ADMIN — ENOXAPARIN SODIUM SCH MG: 100 INJECTION SUBCUTANEOUS at 16:28

## 2022-01-05 NOTE — PN
DATE: 01/05/2022

ATTENDING PHYSICIANS:  Dr. Aggarwal and Dr. Rizo.



SUBJECTIVE:  The patient unfortunately is still here.  This is her third week in

the hospital.  She is comfortable, eating lunch.  No new complaints.  She still 

has exertional dyspnea.



OBJECTIVE FINDINGS:

VITAL SIGHS:  Blood pressure this morning is 160/80, pulse is 55 and regular.  

She is afebrile.  Oxygen saturation is 93% on 6 liters by nasal cannula.

HEENT:  Head is without trauma.  Pupils are reactive.  Sclerae are nonicteric.  

Oropharynx appears clear.

NECK:  Supple.  No bruits.

LUNGS:  Coarse rhonchi bilaterally.

CARDIOVASCULAR:  Shows regular heart tones.

ABDOMEN:  Soft.

EXTREMITIES:  Without edema.

NEUROLOGIC:  Focally intact.  Speech is fluent.



ASSESSMENT:

1.  A 76-year-old female with bilateral COVID-19 pneumonia.

2.  Acute hypoxemic respiratory failure, still requiring high-flow supplemental 

oxygen.

3.  Essential hypertension, labile.

4.  Underlying chronic interstitial lung disease, likely due to birds exposure.



PLAN:

1.  Continue dexamethasone.

2.  Continue DVT prophylaxis.

3.  Continue weaning down supplemental oxygen.

4.  She is on a waiting list for an LTAC for continued care.













NAYELI/BRITTNEY

DR: NAYELI/gilmer   DD: 01/05/2022 12:20

DT: 01/05/2022 19:14   TID: [id number]

## 2022-01-06 VITALS — SYSTOLIC BLOOD PRESSURE: 154 MMHG | DIASTOLIC BLOOD PRESSURE: 68 MMHG

## 2022-01-06 VITALS — SYSTOLIC BLOOD PRESSURE: 163 MMHG | DIASTOLIC BLOOD PRESSURE: 72 MMHG

## 2022-01-06 VITALS — DIASTOLIC BLOOD PRESSURE: 76 MMHG | SYSTOLIC BLOOD PRESSURE: 153 MMHG

## 2022-01-06 VITALS — SYSTOLIC BLOOD PRESSURE: 155 MMHG | DIASTOLIC BLOOD PRESSURE: 68 MMHG

## 2022-01-06 VITALS — DIASTOLIC BLOOD PRESSURE: 65 MMHG | SYSTOLIC BLOOD PRESSURE: 142 MMHG

## 2022-01-06 VITALS — SYSTOLIC BLOOD PRESSURE: 159 MMHG | DIASTOLIC BLOOD PRESSURE: 74 MMHG

## 2022-01-06 VITALS — DIASTOLIC BLOOD PRESSURE: 73 MMHG | SYSTOLIC BLOOD PRESSURE: 161 MMHG

## 2022-01-06 VITALS — DIASTOLIC BLOOD PRESSURE: 91 MMHG | SYSTOLIC BLOOD PRESSURE: 192 MMHG

## 2022-01-06 VITALS — DIASTOLIC BLOOD PRESSURE: 73 MMHG | SYSTOLIC BLOOD PRESSURE: 153 MMHG

## 2022-01-06 VITALS — SYSTOLIC BLOOD PRESSURE: 158 MMHG | DIASTOLIC BLOOD PRESSURE: 72 MMHG

## 2022-01-06 VITALS — DIASTOLIC BLOOD PRESSURE: 63 MMHG | SYSTOLIC BLOOD PRESSURE: 147 MMHG

## 2022-01-06 VITALS — SYSTOLIC BLOOD PRESSURE: 155 MMHG | DIASTOLIC BLOOD PRESSURE: 75 MMHG

## 2022-01-06 VITALS — SYSTOLIC BLOOD PRESSURE: 150 MMHG | DIASTOLIC BLOOD PRESSURE: 61 MMHG

## 2022-01-06 LAB
% ATYL: 2 % (ref 0–0)
% BANDS: 5 % (ref 0–9)
% LYMPHS: 13 % (ref 24–48)
% MONOS: 5 % (ref 0–10)
% SEGS: 75 % (ref 35–66)
ALBUMIN SERPL-MCNC: 2.6 G/DL (ref 3.4–5)
ALBUMIN/GLOB SERPL: 0.8 {RATIO} (ref 1–1.7)
ALP SERPL-CCNC: 250 U/L (ref 46–116)
ALT SERPL-CCNC: 538 U/L (ref 14–59)
ANION GAP SERPL CALC-SCNC: 8 MMOL/L (ref 6–14)
AST SERPL-CCNC: 118 U/L (ref 15–37)
BASOPHILS # BLD AUTO: 0 X10^3/UL (ref 0–0.2)
BASOPHILS NFR BLD: 0 % (ref 0–3)
BILIRUB SERPL-MCNC: 0.4 MG/DL (ref 0.2–1)
BUN/CREAT SERPL: 34 (ref 6–20)
BURR CELLS BLD QL SMEAR: (no result)
CA-I SERPL ISE-MCNC: 31 MG/DL (ref 7–20)
CALCIUM SERPL-MCNC: 8.4 MG/DL (ref 8.5–10.1)
CHLORIDE SERPL-SCNC: 106 MMOL/L (ref 98–107)
CO2 SERPL-SCNC: 26 MMOL/L (ref 21–32)
CREAT SERPL-MCNC: 0.9 MG/DL (ref 0.6–1)
EOSINOPHIL NFR BLD: 0 % (ref 0–3)
EOSINOPHIL NFR BLD: 0 X10^3/UL (ref 0–0.7)
ERYTHROCYTE [DISTWIDTH] IN BLOOD BY AUTOMATED COUNT: 14.9 % (ref 11.5–14.5)
GFR SERPLBLD BASED ON 1.73 SQ M-ARVRAT: 61.2 ML/MIN
GLOBULIN SER-MCNC: 3.1 G/DL (ref 2.2–3.8)
GLUCOSE SERPL-MCNC: 179 MG/DL (ref 70–99)
HCT VFR BLD CALC: 36.5 % (ref 36–47)
HGB BLD-MCNC: 11.8 G/DL (ref 12–15.5)
LYMPHOCYTES # BLD: 1.6 X10^3/UL (ref 1–4.8)
LYMPHOCYTES NFR BLD AUTO: 11 % (ref 24–48)
MCH RBC QN AUTO: 30 PG (ref 25–35)
MCHC RBC AUTO-ENTMCNC: 32 G/DL (ref 31–37)
MCV RBC AUTO: 93 FL (ref 79–100)
MONO #: 0.8 X10^3/UL (ref 0–1.1)
MONOCYTES NFR BLD: 6 % (ref 0–9)
NEUT #: 12.5 X10^3UL (ref 1.8–7.7)
NEUTROPHILS NFR BLD AUTO: 83 % (ref 31–73)
PLATELET # BLD AUTO: 221 X10^3/UL (ref 140–400)
PLATELET # BLD EST: ADEQUATE 10*3/UL
POTASSIUM SERPL-SCNC: 4.7 MMOL/L (ref 3.5–5.1)
PROT SERPL-MCNC: 5.7 G/DL (ref 6.4–8.2)
RBC # BLD AUTO: 3.95 X10^6/UL (ref 3.5–5.4)
SODIUM SERPL-SCNC: 140 MMOL/L (ref 136–145)
TARGETS BLD QL SMEAR: (no result)
WBC # BLD AUTO: 15 X10^3/UL (ref 4–11)

## 2022-01-06 RX ADMIN — FAMOTIDINE SCH MG: 20 TABLET ORAL at 20:33

## 2022-01-06 RX ADMIN — LOSARTAN POTASSIUM SCH MG: 25 TABLET, FILM COATED ORAL at 08:35

## 2022-01-06 RX ADMIN — INSULIN LISPRO SCH UNITS: 100 INJECTION, SOLUTION INTRAVENOUS; SUBCUTANEOUS at 08:00

## 2022-01-06 RX ADMIN — BUDESONIDE PRN MG: 0.5 SUSPENSION RESPIRATORY (INHALATION) at 08:34

## 2022-01-06 RX ADMIN — Medication SCH CAP: at 20:32

## 2022-01-06 RX ADMIN — ENOXAPARIN SODIUM SCH MG: 100 INJECTION SUBCUTANEOUS at 16:57

## 2022-01-06 RX ADMIN — DEXAMETHASONE SODIUM PHOSPHATE SCH MG: 4 INJECTION, SOLUTION INTRAMUSCULAR; INTRAVENOUS at 08:34

## 2022-01-06 RX ADMIN — INSULIN LISPRO SCH UNITS: 100 INJECTION, SOLUTION INTRAVENOUS; SUBCUTANEOUS at 17:45

## 2022-01-06 RX ADMIN — Medication SCH MG: at 08:35

## 2022-01-06 RX ADMIN — INSULIN LISPRO SCH UNITS: 100 INJECTION, SOLUTION INTRAVENOUS; SUBCUTANEOUS at 15:20

## 2022-01-06 RX ADMIN — Medication SCH CAP: at 08:34

## 2022-01-06 RX ADMIN — ZINC SULFATE CAP 220 MG (50 MG ELEMENTAL ZN) SCH MG: 220 (50 ZN) CAP at 08:35

## 2022-01-06 RX ADMIN — FAMOTIDINE SCH MG: 20 TABLET ORAL at 08:35

## 2022-01-06 NOTE — PN
DATE: 01/06/2022

ATTENDING PHYSICIAN:  Dr. Aggarwal and Dr. Rizo.



SUBJECTIVE:  The patient is comfortable.  She is up in a chair.  She denies any 

dyspnea or pain.  We are going to have her down to 6 liters oxygen by nasal 

cannula.



OBJECTIVE FINDINGS:

VITAL SIGNS:  Blood pressure this morning is labile, it is between 150 and 160, 

oxygen saturation 93% on 6 liters of nasal cannula, pulse 74 and regular.  She 

is afebrile.

HEENT:  Head is without trauma.  Pupils are reactive.  Sclerae nonicteric.  

Oropharynx clear.

NECK:  Supple, no bruits.

LUNGS:  Good breath sounds.  No wheezing, rales or rhonchi.

CARDIOVASCULAR:  Regular heart tones.

ABDOMEN:  Soft.

EXTREMITIES:  Without edema.

NEUROLOGIC:  Focally intact.



ASSESSMENT:

1.  This 76-year-old female has bilateral COVID-19 pneumonia in an unvaccinated 

patient.

2.  Acute hypoxemic respiratory failure requiring less oxygen over the last 

several days.

3.  Labile essential hypertension.

4.  Underlying chronic interstitial lung disease, likely due to bird exposure.



PLAN:

1.  Continue dexamethasone.

2.  Wean down supplemental oxygen.

3.  Downgrade from ICU status.

4.  Trying to wean down further supplemental oxygen.  She may not need an LTAC 

in the near future.







NAYELI/DEYSI

DR: NAYELI/gilmer   DD: 01/06/2022 10:40

DT: 01/06/2022 11:04   TID: [id number]

## 2022-01-07 VITALS — SYSTOLIC BLOOD PRESSURE: 156 MMHG | DIASTOLIC BLOOD PRESSURE: 72 MMHG

## 2022-01-07 VITALS — SYSTOLIC BLOOD PRESSURE: 165 MMHG | DIASTOLIC BLOOD PRESSURE: 75 MMHG

## 2022-01-07 VITALS — DIASTOLIC BLOOD PRESSURE: 75 MMHG | SYSTOLIC BLOOD PRESSURE: 165 MMHG

## 2022-01-07 RX ADMIN — ZINC SULFATE CAP 220 MG (50 MG ELEMENTAL ZN) SCH MG: 220 (50 ZN) CAP at 09:59

## 2022-01-07 RX ADMIN — Medication SCH CAP: at 10:00

## 2022-01-07 RX ADMIN — FAMOTIDINE SCH MG: 20 TABLET ORAL at 10:00

## 2022-01-07 RX ADMIN — Medication SCH MG: at 09:58

## 2022-01-07 RX ADMIN — INSULIN LISPRO SCH UNITS: 100 INJECTION, SOLUTION INTRAVENOUS; SUBCUTANEOUS at 08:00

## 2022-01-07 RX ADMIN — LOSARTAN POTASSIUM SCH MG: 25 TABLET, FILM COATED ORAL at 09:59

## 2022-01-07 RX ADMIN — BUDESONIDE PRN MG: 0.5 SUSPENSION RESPIRATORY (INHALATION) at 10:05

## 2022-01-07 RX ADMIN — INSULIN LISPRO SCH UNITS: 100 INJECTION, SOLUTION INTRAVENOUS; SUBCUTANEOUS at 12:00

## 2022-01-07 RX ADMIN — DEXAMETHASONE SODIUM PHOSPHATE SCH MG: 4 INJECTION, SOLUTION INTRAMUSCULAR; INTRAVENOUS at 10:05

## 2022-01-07 NOTE — DS
DATE OF DISCHARGE: 01/07/2022

ATTENDING PHYSICIAN:  Dr. Aggarwal.



FINAL DISCHARGE DIAGNOSES:

1.  COVID-19 pneumonia, bilateral.

2.  Acute hypoxemic respiratory failure.

3.  Labile hypertension.

4.  Underlying chronic interstitial lung disease, most likely related to bird 

exposure.



HISTORY AND PHYSICAL:  This very pleasant 76-year-old female has increasing 

shortness of breath.  She has been non-vaccinated.  She did test positive with 

coronavirus.  She was admitted for further treatment and evaluation.



PHYSICAL EXAMINATION:  Please see the dictated note.



PERTINENT LABORATORY AND X-RAY STUDIES:  Prior to discharge, she had numerous 

x-rays done. They are on the database. Prior to discharge, her hemoglobin was 

stable at 11.8 g/dL, white count was still slightly elevated at 15,000 due to 

margination of white cells from steroid therapy.  Electrolytes within normal 

range.  Creatinine is 0.9 mg/dL, potassium 4.7 mmol/L.  Serology on admission 

was indeed positive for coronavirus.



COURSE IN THE HOSPITAL:  The patient was admitted.  She had a prolonged course. 

She got very hypoxemic, requiring close to 100% oxygen at one time, she was a 

DNR and did not want intubation.  She was given the usual standard therapy, 

corticosteroids along with a daily Lovenox and empiric intravenous antibiotics. 

Gradually, she was able to wean down and was doing much better.  We were able to

get her discharged to a long-term care facility on the 18th hospital day. Her 

discharge medications include vitamin C, budesonide inhaler, Decadron down to 8 

mg p.o. daily, Pepcid 40 mg daily, Cozaar 100 mg p.o. daily, amlodipine 5 mg 

daily and zinc sulfate 220 mg daily.  Her prognosis is fair.  She was discharged

then from our hospital. She was down to 5 liters by nasal cannula to maintain 

saturations.  She was discharged in a stable condition with explicit drug and 

followup care.



Total discharge time spent 42 minutes.



[facility]  [facility]

[place], [place]







NAYELI/ARV

DR: NAYELI/gilmer   DD: 01/07/2022 09:44

DT: 01/07/2022 11:55   TID: [id number]